# Patient Record
Sex: MALE | Race: WHITE | NOT HISPANIC OR LATINO | Employment: FULL TIME
[De-identification: names, ages, dates, MRNs, and addresses within clinical notes are randomized per-mention and may not be internally consistent; named-entity substitution may affect disease eponyms.]

---

## 2022-01-01 ENCOUNTER — HEALTH MAINTENANCE LETTER (OUTPATIENT)
Age: 27
End: 2022-01-01

## 2022-01-19 ENCOUNTER — DOCUMENTATION ONLY (OUTPATIENT)
Dept: FAMILY MEDICINE | Facility: CLINIC | Age: 27
End: 2022-01-19
Payer: COMMERCIAL

## 2022-01-19 NOTE — PROGRESS NOTES
Opening patient's chart to look over record since we were faxed [facility] records for establishing care. I confirmed that we could see these records in Care Everywhere so I will shred the paper copy.    Marlen Finn MD

## 2022-04-11 ENCOUNTER — E-VISIT (OUTPATIENT)
Dept: FAMILY MEDICINE | Facility: CLINIC | Age: 27
End: 2022-04-11
Payer: COMMERCIAL

## 2022-04-11 DIAGNOSIS — L20.89 OTHER ATOPIC DERMATITIS: Primary | ICD-10-CM

## 2022-04-11 PROCEDURE — 99421 OL DIG E/M SVC 5-10 MIN: CPT | Performed by: PHYSICIAN ASSISTANT

## 2022-04-11 RX ORDER — TRIAMCINOLONE ACETONIDE 1 MG/G
OINTMENT TOPICAL 2 TIMES DAILY
Qty: 80 G | Refills: 1 | Status: SHIPPED | OUTPATIENT
Start: 2022-04-11 | End: 2022-08-04

## 2022-08-04 ENCOUNTER — HOSPITAL ENCOUNTER (EMERGENCY)
Facility: CLINIC | Age: 27
Discharge: HOME | End: 2022-08-04
Attending: NURSE PRACTITIONER | Admitting: NURSE PRACTITIONER
Payer: COMMERCIAL

## 2022-08-04 VITALS
HEART RATE: 84 BPM | SYSTOLIC BLOOD PRESSURE: 137 MMHG | RESPIRATION RATE: 16 BRPM | OXYGEN SATURATION: 100 % | DIASTOLIC BLOOD PRESSURE: 76 MMHG

## 2022-08-04 DIAGNOSIS — F41.9 ANXIETY: ICD-10-CM

## 2022-08-04 PROCEDURE — G0463 HOSPITAL OUTPT CLINIC VISIT: HCPCS | Performed by: NURSE PRACTITIONER

## 2022-08-04 PROCEDURE — 99214 OFFICE O/P EST MOD 30 MIN: CPT | Performed by: NURSE PRACTITIONER

## 2022-08-04 RX ORDER — HYDROXYZINE HYDROCHLORIDE 25 MG/1
25-50 TABLET, FILM COATED ORAL 3 TIMES DAILY PRN
Qty: 30 TABLET | Refills: 0 | Status: SHIPPED | OUTPATIENT
Start: 2022-08-04 | End: 2022-11-07

## 2022-08-04 NOTE — ED PROVIDER NOTES
History     Chief Complaint   Patient presents with     Anxiety     History of anxiety, not currently taking any medication. Started seeing counselor this week for anxiety. No thoughts of harming self or others. Anxiety for whole life but becoming more prevalent. Has history with anxiety; establishing PCP at end of month due to move.     HPI  Ivan Mcguire is a 27 year old male who presents to urgent care with concerns of ongoing anxiety.  Patient reports chronic life history of anxiety.  Patient states he previously was ending [employer] and had been on Paxil.  Patient states due to negative side effects of Paxil including GI upset, depression he discontinued the Paxil.  Patient states he has not been seen for a few years in [place].  Patient states he is recently moved to [place].  Patient reports his employer has provided him with counseling at work that he just started this week.  Patient denies suicidal thoughts, suicidal plans, homicidal thoughts.  Patient has not tried any other therapies or treatments at this point in time for his anxiety.  Patient does have an appointment at the end of the month at the [facility] for assessment and treatment of his anxiety.    Allergies:  No Known Allergies    Problem List:    Patient Active Problem List    Diagnosis Date Noted     LIZZETH (generalized anxiety disorder) 03/31/2014     Priority: Medium     Seasonal allergic rhinitis 08/18/2008     Priority: Medium        Past Medical History:    Past Medical History:   Diagnosis Date     Allergic rhinitis due to other allergen      Moderate persistent asthma 4/18/2005       Past Surgical History:    No past surgical history on file.    Family History:    Family History   Problem Relation Age of Onset     Hypertension Father      Allergies Father      Alzheimer Disease Maternal Grandmother      Arthritis Maternal Grandfather      Arthritis Paternal Grandmother        Social History:  Marital Status:  Single [1]  Social  History     Tobacco Use     Smoking status: Never Smoker     Smokeless tobacco: Never Used   Substance Use Topics     Alcohol use: No     Drug use: No        Medications:    hydrOXYzine (ATARAX) 25 MG tablet      Review of Systems  As mentioned above in the history present illness. All other systems were reviewed and are negative.    Physical Exam   BP: 137/76  Pulse: 84  Resp: 16  SpO2: 100 %      Physical Exam  Vitals and nursing note reviewed.   Constitutional:       General: He is not in acute distress.     Appearance: He is well-developed. He is not ill-appearing, toxic-appearing or diaphoretic.   HENT:      Head: Normocephalic and atraumatic.      Right Ear: External ear normal.      Left Ear: External ear normal.      Nose: Nose normal.   Eyes:      General:         Right eye: No discharge.         Left eye: No discharge.      Conjunctiva/sclera: Conjunctivae normal.   Cardiovascular:      Rate and Rhythm: Normal rate and regular rhythm.      Heart sounds: Normal heart sounds. No murmur heard.    No friction rub. No gallop.   Pulmonary:      Effort: Pulmonary effort is normal.      Breath sounds: Normal breath sounds. No stridor. No wheezing.   Skin:     General: Skin is warm.      Findings: No rash.   Neurological:      Mental Status: He is alert and oriented to person, place, and time.   Psychiatric:         Attention and Perception: Attention and perception normal.         Mood and Affect: Mood is anxious.         Speech: Speech normal.         Behavior: Behavior normal. Behavior is cooperative.         Thought Content: Thought content normal.         Cognition and Memory: Cognition normal.         ED Course                 Procedures    No results found for this or any previous visit (from the past 24 hour(s)).    Medications - No data to display    Assessments & Plan (with Medical Decision Making)     I have reviewed the nursing notes.    I have reviewed the findings, diagnosis, plan and need for follow  up with the patient.  27-year-old male with lifelong history of anxiety presenting for evaluation of his anxiety.  Patient denies acute worsening.  Patient denying suicidal or homicidal thoughts.  He reports he is just moved into the area and seeking to initiate care but appointment is not till the end of the month.  Patient states he was last seen in [place] 2 to 3 years ago.  Exam findings reveal patient to be alert, orientated, anxious.  Patient appears earnest in seeking treatment.  Offered patient hydroxyzine and discussed importance of counseling and the additive benefit of counseling plus medication versus counseling alone versus medication alone.  Patient states he only has Fridays off.  We were able to obtain an appointment for patient tomorrow at the [facility].  Discussed this with patient.  Prescribed hydroxyzine.  Given instructions for hydroxyzine.  Patient discharged in stable condition.    Discharge Medication List as of 8/4/2022  6:50 PM      START taking these medications    Details   hydrOXYzine (ATARAX) 25 MG tablet Take 1-2 tablets (25-50 mg) by mouth 3 times daily as needed for itching or anxiety, Disp-30 tablet, R-0, E-Prescribe             Final diagnoses:   Anxiety       8/4/2022   [facility] EMERGENCY DEPT     Paul, Angie Odonnell, SAUNDRA CNP  08/04/22 5718

## 2022-09-16 ASSESSMENT — ENCOUNTER SYMPTOMS
CONSTIPATION: 0
MYALGIAS: 0
FREQUENCY: 0
PALPITATIONS: 1
FEVER: 0
HEMATOCHEZIA: 0
DIARRHEA: 0
PARESTHESIAS: 0
NAUSEA: 0
HEMATURIA: 0
HEARTBURN: 0
EYE PAIN: 0
SHORTNESS OF BREATH: 0
ARTHRALGIAS: 0
DYSURIA: 0
JOINT SWELLING: 0
NERVOUS/ANXIOUS: 1
HEADACHES: 0
COUGH: 0
DIZZINESS: 0
SORE THROAT: 0
WEAKNESS: 0
ABDOMINAL PAIN: 0
CHILLS: 0

## 2022-09-16 ASSESSMENT — PATIENT HEALTH QUESTIONNAIRE - PHQ9
10. IF YOU CHECKED OFF ANY PROBLEMS, HOW DIFFICULT HAVE THESE PROBLEMS MADE IT FOR YOU TO DO YOUR WORK, TAKE CARE OF THINGS AT HOME, OR GET ALONG WITH OTHER PEOPLE: VERY DIFFICULT
SUM OF ALL RESPONSES TO PHQ QUESTIONS 1-9: 12
SUM OF ALL RESPONSES TO PHQ QUESTIONS 1-9: 12

## 2022-09-23 ENCOUNTER — OFFICE VISIT (OUTPATIENT)
Dept: FAMILY MEDICINE | Facility: CLINIC | Age: 27
End: 2022-09-23
Payer: COMMERCIAL

## 2022-09-23 VITALS
HEIGHT: 68 IN | TEMPERATURE: 98.2 F | RESPIRATION RATE: 18 BRPM | HEART RATE: 70 BPM | DIASTOLIC BLOOD PRESSURE: 82 MMHG | SYSTOLIC BLOOD PRESSURE: 126 MMHG | WEIGHT: 211.2 LBS | BODY MASS INDEX: 32.01 KG/M2 | OXYGEN SATURATION: 98 %

## 2022-09-23 DIAGNOSIS — Z00.00 ROUTINE GENERAL MEDICAL EXAMINATION AT A HEALTH CARE FACILITY: Primary | ICD-10-CM

## 2022-09-23 DIAGNOSIS — Z13.220 SCREENING FOR HYPERLIPIDEMIA: ICD-10-CM

## 2022-09-23 DIAGNOSIS — Z11.59 NEED FOR HEPATITIS C SCREENING TEST: ICD-10-CM

## 2022-09-23 DIAGNOSIS — H61.23 BILATERAL IMPACTED CERUMEN: ICD-10-CM

## 2022-09-23 DIAGNOSIS — F33.1 MODERATE EPISODE OF RECURRENT MAJOR DEPRESSIVE DISORDER (H): ICD-10-CM

## 2022-09-23 DIAGNOSIS — F41.9 ANXIETY: ICD-10-CM

## 2022-09-23 DIAGNOSIS — Z11.4 SCREENING FOR HIV (HUMAN IMMUNODEFICIENCY VIRUS): ICD-10-CM

## 2022-09-23 LAB
ALBUMIN SERPL BCG-MCNC: 4.9 G/DL (ref 3.5–5.2)
ALP SERPL-CCNC: 82 U/L (ref 40–129)
ALT SERPL W P-5'-P-CCNC: 37 U/L (ref 10–50)
ANION GAP SERPL CALCULATED.3IONS-SCNC: 15 MMOL/L (ref 7–15)
AST SERPL W P-5'-P-CCNC: 31 U/L (ref 10–50)
BILIRUB SERPL-MCNC: 0.6 MG/DL
BUN SERPL-MCNC: 12.1 MG/DL (ref 6–20)
CALCIUM SERPL-MCNC: 9.3 MG/DL (ref 8.6–10)
CHLORIDE SERPL-SCNC: 100 MMOL/L (ref 98–107)
CHOLEST SERPL-MCNC: 227 MG/DL
CREAT SERPL-MCNC: 0.97 MG/DL (ref 0.67–1.17)
DEPRECATED HCO3 PLAS-SCNC: 26 MMOL/L (ref 22–29)
ERYTHROCYTE [DISTWIDTH] IN BLOOD BY AUTOMATED COUNT: 11.7 % (ref 10–15)
GFR SERPL CREATININE-BSD FRML MDRD: >90 ML/MIN/1.73M2
GLUCOSE SERPL-MCNC: 102 MG/DL (ref 70–99)
HCT VFR BLD AUTO: 47.4 % (ref 40–53)
HDLC SERPL-MCNC: 40 MG/DL
HGB BLD-MCNC: 15.9 G/DL (ref 13.3–17.7)
LDLC SERPL CALC-MCNC: 138 MG/DL
MCH RBC QN AUTO: 29.9 PG (ref 26.5–33)
MCHC RBC AUTO-ENTMCNC: 33.5 G/DL (ref 31.5–36.5)
MCV RBC AUTO: 89 FL (ref 78–100)
NONHDLC SERPL-MCNC: 187 MG/DL
PLATELET # BLD AUTO: 272 10E3/UL (ref 150–450)
POTASSIUM SERPL-SCNC: 4.2 MMOL/L (ref 3.4–5.3)
PROT SERPL-MCNC: 8.2 G/DL (ref 6.4–8.3)
RBC # BLD AUTO: 5.32 10E6/UL (ref 4.4–5.9)
SODIUM SERPL-SCNC: 141 MMOL/L (ref 136–145)
TRIGL SERPL-MCNC: 245 MG/DL
TSH SERPL DL<=0.005 MIU/L-ACNC: 1.7 UIU/ML (ref 0.3–4.2)
WBC # BLD AUTO: 5.4 10E3/UL (ref 4–11)

## 2022-09-23 PROCEDURE — 85027 COMPLETE CBC AUTOMATED: CPT | Performed by: NURSE PRACTITIONER

## 2022-09-23 PROCEDURE — 80053 COMPREHEN METABOLIC PANEL: CPT | Performed by: NURSE PRACTITIONER

## 2022-09-23 PROCEDURE — 80061 LIPID PANEL: CPT | Performed by: NURSE PRACTITIONER

## 2022-09-23 PROCEDURE — 82306 VITAMIN D 25 HYDROXY: CPT | Performed by: NURSE PRACTITIONER

## 2022-09-23 PROCEDURE — 99385 PREV VISIT NEW AGE 18-39: CPT | Mod: 25 | Performed by: NURSE PRACTITIONER

## 2022-09-23 PROCEDURE — 99214 OFFICE O/P EST MOD 30 MIN: CPT | Mod: 25 | Performed by: NURSE PRACTITIONER

## 2022-09-23 PROCEDURE — 87389 HIV-1 AG W/HIV-1&-2 AB AG IA: CPT | Performed by: NURSE PRACTITIONER

## 2022-09-23 PROCEDURE — 36415 COLL VENOUS BLD VENIPUNCTURE: CPT | Performed by: NURSE PRACTITIONER

## 2022-09-23 PROCEDURE — 84443 ASSAY THYROID STIM HORMONE: CPT | Performed by: NURSE PRACTITIONER

## 2022-09-23 PROCEDURE — 69210 REMOVE IMPACTED EAR WAX UNI: CPT | Performed by: NURSE PRACTITIONER

## 2022-09-23 PROCEDURE — 90686 IIV4 VACC NO PRSV 0.5 ML IM: CPT | Performed by: NURSE PRACTITIONER

## 2022-09-23 PROCEDURE — 90471 IMMUNIZATION ADMIN: CPT | Performed by: NURSE PRACTITIONER

## 2022-09-23 PROCEDURE — 86803 HEPATITIS C AB TEST: CPT | Performed by: NURSE PRACTITIONER

## 2022-09-23 RX ORDER — FLUOXETINE 10 MG/1
10 CAPSULE ORAL DAILY
Qty: 90 CAPSULE | Refills: 0 | Status: SHIPPED | OUTPATIENT
Start: 2022-09-23 | End: 2022-09-23

## 2022-09-23 RX ORDER — FLUOXETINE 10 MG/1
10 CAPSULE ORAL DAILY
Qty: 90 CAPSULE | Refills: 0 | Status: SHIPPED | OUTPATIENT
Start: 2022-09-23 | End: 2022-11-07

## 2022-09-23 ASSESSMENT — ENCOUNTER SYMPTOMS
PALPITATIONS: 1
CONSTIPATION: 0
PARESTHESIAS: 0
HEMATURIA: 0
EYE PAIN: 0
SORE THROAT: 0
DYSURIA: 0
DIARRHEA: 0
ABDOMINAL PAIN: 0
WEAKNESS: 0
NERVOUS/ANXIOUS: 1
HEARTBURN: 0
HEADACHES: 0
JOINT SWELLING: 0
SHORTNESS OF BREATH: 0
FEVER: 0
ARTHRALGIAS: 0
COUGH: 0
MYALGIAS: 0
NAUSEA: 0
FREQUENCY: 0
CHILLS: 0
HEMATOCHEZIA: 0
DIZZINESS: 0

## 2022-09-23 ASSESSMENT — ANXIETY QUESTIONNAIRES
7. FEELING AFRAID AS IF SOMETHING AWFUL MIGHT HAPPEN: NOT AT ALL
GAD7 TOTAL SCORE: 15
GAD7 TOTAL SCORE: 15
6. BECOMING EASILY ANNOYED OR IRRITABLE: NOT AT ALL
3. WORRYING TOO MUCH ABOUT DIFFERENT THINGS: NEARLY EVERY DAY
5. BEING SO RESTLESS THAT IT IS HARD TO SIT STILL: NEARLY EVERY DAY
1. FEELING NERVOUS, ANXIOUS, OR ON EDGE: NEARLY EVERY DAY
4. TROUBLE RELAXING: NEARLY EVERY DAY
2. NOT BEING ABLE TO STOP OR CONTROL WORRYING: NEARLY EVERY DAY

## 2022-09-23 ASSESSMENT — PAIN SCALES - GENERAL: PAINLEVEL: NO PAIN (0)

## 2022-09-23 NOTE — PROGRESS NOTES
"SUBJECTIVE:   CC: Ivan is an 27 year old who presents for preventative health visit.       Patient has been advised of split billing requirements and indicates understanding: Yes  Healthy Habits:     Getting at least 3 servings of Calcium per day:  NO    Bi-annual eye exam:  Yes    Dental care twice a year:  Yes    Sleep apnea or symptoms of sleep apnea:  Daytime drowsiness and Excessive snoring    Diet:  Regular (no restrictions)    Frequency of exercise:  6-7 days/week    Duration of exercise:  45-60 minutes    Taking medications regularly:  Yes    Medication side effects:  Not applicable    PHQ-2 Total Score: 3    Additional concerns today:  No        * Would like to start a daily medication for anxiety, feels it is time. He has been on paxil in the past but this caused poor side effects. His anxiety is present daily. It is impacting daily life. He feels like his mind is always racing. He does think about life from 5-6 years ago and does a \"coulda, woulda, shoulda\". He has cancelled and rescheduled appointments in the patients. He has utilized his EAP. He feels like this is going well.     Wondering if he should get his covid booster today, had covid at the end of August.      Will get flu shot today    Today's PHQ-2 Score:   PHQ-2 ( 1999 Pfizer) 9/16/2022   Q1: Little interest or pleasure in doing things 0   Q2: Feeling down, depressed or hopeless 3   PHQ-2 Score 3   Q1: Little interest or pleasure in doing things Not at all   Q2: Feeling down, depressed or hopeless Nearly every day   PHQ-2 Score 3     PHQ 8/19/2022 9/16/2022   PHQ-9 Total Score 13 12   Q9: Thoughts of better off dead/self-harm past 2 weeks Not at all Not at all         Abuse: Current or Past(Physical, Sexual or Emotional)- No  Do you feel safe in your environment? Yes    Have you ever done Advance Care Planning? (For example, a Health Directive, POLST, or a discussion with a medical provider or your loved ones about your wishes): No, advance " care planning information given to patient to review.  Patient declined advance care planning discussion at this time.    Social History     Tobacco Use     Smoking status: Never Smoker     Smokeless tobacco: Never Used   Substance Use Topics     Alcohol use: No         Alcohol Use 9/16/2022   Prescreen: >3 drinks/day or >7 drinks/week? No       Last PSA: No results found for: PSA    Reviewed orders with patient. Reviewed health maintenance and updated orders accordingly - Yes  Lab work is in process  Labs reviewed in EPIC  BP Readings from Last 3 Encounters:   09/23/22 126/82   08/04/22 137/76   07/03/14 137/72    Wt Readings from Last 3 Encounters:   09/23/22 95.8 kg (211 lb 3.2 oz)   07/03/14 77.1 kg (170 lb)   03/27/14 78.7 kg (173 lb 6.4 oz)                  Patient Active Problem List   Diagnosis     Seasonal allergic rhinitis     LIZZETH (generalized anxiety disorder)     History reviewed. No pertinent surgical history.    Social History     Tobacco Use     Smoking status: Never Smoker     Smokeless tobacco: Never Used   Substance Use Topics     Alcohol use: No     Family History   Problem Relation Age of Onset     Hypertension Father      Allergies Father      Alzheimer Disease Maternal Grandmother      Arthritis Maternal Grandfather      Arthritis Paternal Grandmother      Hypertension Mother          Current Outpatient Medications   Medication Sig Dispense Refill     FLUoxetine (PROZAC) 10 MG capsule Take 1 capsule (10 mg) by mouth daily 90 capsule 0     hydrOXYzine (ATARAX) 25 MG tablet Take 1-2 tablets (25-50 mg) by mouth 3 times daily as needed for itching or anxiety 30 tablet 0     No Known Allergies  No lab results found.     Reviewed and updated as needed this visit by clinical staff   Tobacco  Allergies  Meds  Problems  Med Hx  Surg Hx  Fam Hx  Soc   Hx          Reviewed and updated as needed this visit by Provider   Tobacco  Allergies  Meds  Problems  Med Hx  Surg Hx  Fam Hx          "  Past Medical History:   Diagnosis Date     Allergic rhinitis due to other allergen      Depressive disorder Not sure    Christel been depressed for quite a long time, and have been on medication in the past     Moderate persistent asthma 04/18/2005      History reviewed. No pertinent surgical history.    Review of Systems   Constitutional: Negative for chills and fever.   HENT: Negative for congestion, ear pain, hearing loss and sore throat.    Eyes: Negative for pain and visual disturbance.   Respiratory: Negative for cough and shortness of breath.    Cardiovascular: Positive for palpitations. Negative for chest pain and peripheral edema.   Gastrointestinal: Negative for abdominal pain, constipation, diarrhea, heartburn, hematochezia and nausea.   Genitourinary: Negative for dysuria, frequency, genital sores, hematuria, impotence, penile discharge and urgency.   Musculoskeletal: Negative for arthralgias, joint swelling and myalgias.   Skin: Negative for rash.   Neurological: Negative for dizziness, weakness, headaches and paresthesias.   Psychiatric/Behavioral: Negative for mood changes. The patient is nervous/anxious.          OBJECTIVE:   /82 (BP Location: Right arm, Patient Position: Chair, Cuff Size: Adult Large)   Pulse 70   Temp 98.2  F (36.8  C) (Tympanic)   Resp 18   Ht 1.727 m (5' 8\")   Wt 95.8 kg (211 lb 3.2 oz)   SpO2 98%   BMI 32.11 kg/m      Physical Exam  GENERAL: healthy, alert and no distress  EYES: Eyes grossly normal to inspection, PERRL and conjunctivae and sclerae normal  HENT: Bilateral TMs occluded with soft cerumen.  I personally utilized lighted speculum to remove cerumen bilaterally patient tolerated procedure well and was successful.  Ear canals and TM's normal, nose and mouth without ulcers or lesions  NECK: no adenopathy, no asymmetry, masses, or scars and thyroid normal to palpation  RESP: lungs clear to auscultation - no rales, rhonchi or wheezes  CV: regular rate and rhythm, " normal S1 S2, no S3 or S4, no murmur, click or rub, no peripheral edema and peripheral pulses strong  ABDOMEN: soft, nontender, no hepatosplenomegaly, no masses and bowel sounds normal  MS: no gross musculoskeletal defects noted, no edema  SKIN: no suspicious lesions or rashes  NEURO: Normal strength and tone, mentation intact and speech normal  PSYCH: mentation appears normal, affect normal/bright      ASSESSMENT/PLAN:   1. Routine general medical examination at a health care facility  Healthy Male exam completed today.  I discussed preventative measures with the patient including continuing with lifestyle modifications of a balanced diet and regular cardiovascular exercise.  I discussed self testicular exams and how to complete these.  I encouraged patient to follow-up yearly for annual physicals and sooner as needed.     2. Moderate episode of recurrent major depressive disorder (H)  Start fluoxetine discussed risks and benefits of medication as well as potential side effects.  Plan follow-up in approximately 4 weeks to determine dose adjustments if needed or changing medication.  Labs to be completed as below to evaluate for other potential causes of depression and anxiety.  - FLUoxetine (PROZAC) 10 MG capsule; Take 1 capsule (10 mg) by mouth daily  Dispense: 90 capsule; Refill: 0  - TSH with free T4 reflex; Future  - Vitamin D Deficiency; Future  - Comprehensive metabolic panel (BMP + Alb, Alk Phos, ALT, AST, Total. Bili, TP); Future  - CBC with platelets; Future    3. Anxiety  See above  - FLUoxetine (PROZAC) 10 MG capsule; Take 1 capsule (10 mg) by mouth daily  Dispense: 90 capsule; Refill: 0  - TSH with free T4 reflex; Future  - Vitamin D Deficiency; Future  - Comprehensive metabolic panel (BMP + Alb, Alk Phos, ALT, AST, Total. Bili, TP); Future  - CBC with platelets; Future    4. Screening for hyperlipidemia  - Lipid panel reflex to direct LDL Fasting; Future    5. Screening for HIV (human immunodeficiency  "virus)  - HIV Antigen Antibody Combo; Future    6. Need for hepatitis C screening test  - Hepatitis C Screen Reflex to HCV RNA Quant and Genotype; Future    7. Bilateral impacted cerumen  I personally reviewed the cerumen from bilateral ear canals utilizing lighted speculum.  Patient tolerated procedure well is without complaint.      Patient has been advised of split billing requirements and indicates understanding: Yes    COUNSELING:   Reviewed preventive health counseling, as reflected in patient instructions       Regular exercise       Healthy diet/nutrition    Estimated body mass index is 32.11 kg/m  as calculated from the following:    Height as of this encounter: 1.727 m (5' 8\").    Weight as of this encounter: 95.8 kg (211 lb 3.2 oz).     Weight management plan: Discussed healthy diet and exercise guidelines    He reports that he has never smoked. He has never used smokeless tobacco.      Counseling Resources:  ATP IV Guidelines  Pooled Cohorts Equation Calculator  FRAX Risk Assessment  ICSI Preventive Guidelines  Dietary Guidelines for Americans, 2010  USDA's MyPlate  ASA Prophylaxis  Lung CA Screening    Charisse Hernandez, SAUNDRA CNP  [facility]  "

## 2022-09-24 ENCOUNTER — MYC MEDICAL ADVICE (OUTPATIENT)
Dept: FAMILY MEDICINE | Facility: CLINIC | Age: 27
End: 2022-09-24

## 2022-09-26 LAB
DEPRECATED CALCIDIOL+CALCIFEROL SERPL-MC: 44 UG/L (ref 20–75)
HCV AB SERPL QL IA: NONREACTIVE
HIV 1+2 AB+HIV1 P24 AG SERPL QL IA: NONREACTIVE

## 2022-10-12 ENCOUNTER — MYC MEDICAL ADVICE (OUTPATIENT)
Dept: FAMILY MEDICINE | Facility: CLINIC | Age: 27
End: 2022-10-12

## 2022-11-03 ENCOUNTER — HOSPITAL ENCOUNTER (EMERGENCY)
Facility: CLINIC | Age: 27
Discharge: HOME | End: 2022-11-03
Attending: EMERGENCY MEDICINE | Admitting: EMERGENCY MEDICINE
Payer: COMMERCIAL

## 2022-11-03 ENCOUNTER — NURSE TRIAGE (OUTPATIENT)
Dept: NURSING | Facility: CLINIC | Age: 27
End: 2022-11-03

## 2022-11-03 VITALS
SYSTOLIC BLOOD PRESSURE: 122 MMHG | HEIGHT: 69 IN | DIASTOLIC BLOOD PRESSURE: 72 MMHG | OXYGEN SATURATION: 99 % | TEMPERATURE: 97.8 F | WEIGHT: 200 LBS | RESPIRATION RATE: 18 BRPM | BODY MASS INDEX: 29.62 KG/M2 | HEART RATE: 89 BPM

## 2022-11-03 DIAGNOSIS — R30.0 DYSURIA: ICD-10-CM

## 2022-11-03 DIAGNOSIS — R31.29 MICROSCOPIC HEMATURIA: ICD-10-CM

## 2022-11-03 LAB
ALBUMIN UR-MCNC: NEGATIVE MG/DL
APPEARANCE UR: CLEAR
BILIRUB UR QL STRIP: NEGATIVE
CAOX CRY #/AREA URNS HPF: ABNORMAL /HPF
COLOR UR AUTO: YELLOW
GLUCOSE UR STRIP-MCNC: NEGATIVE MG/DL
HGB UR QL STRIP: NEGATIVE
KETONES UR STRIP-MCNC: 40 MG/DL
LEUKOCYTE ESTERASE UR QL STRIP: NEGATIVE
MUCOUS THREADS #/AREA URNS LPF: PRESENT /LPF
NITRATE UR QL: NEGATIVE
PH UR STRIP: 6 [PH] (ref 5–7)
RBC URINE: 5 /HPF
SP GR UR STRIP: 1.02 (ref 1–1.03)
UROBILINOGEN UR STRIP-MCNC: NORMAL MG/DL
WBC URINE: 1 /HPF

## 2022-11-03 PROCEDURE — 87591 N.GONORRHOEAE DNA AMP PROB: CPT | Performed by: EMERGENCY MEDICINE

## 2022-11-03 PROCEDURE — 81001 URINALYSIS AUTO W/SCOPE: CPT | Performed by: EMERGENCY MEDICINE

## 2022-11-03 PROCEDURE — 99282 EMERGENCY DEPT VISIT SF MDM: CPT | Performed by: EMERGENCY MEDICINE

## 2022-11-03 PROCEDURE — 99283 EMERGENCY DEPT VISIT LOW MDM: CPT | Performed by: EMERGENCY MEDICINE

## 2022-11-03 PROCEDURE — 81001 URINALYSIS AUTO W/SCOPE: CPT | Performed by: FAMILY MEDICINE

## 2022-11-03 PROCEDURE — 87491 CHLMYD TRACH DNA AMP PROBE: CPT | Performed by: EMERGENCY MEDICINE

## 2022-11-03 ASSESSMENT — ENCOUNTER SYMPTOMS
HEMATURIA: 0
VOMITING: 0
WOUND: 0
COUGH: 0
FATIGUE: 0
NERVOUS/ANXIOUS: 1
BACK PAIN: 0
NAUSEA: 0
FLANK PAIN: 0
CHILLS: 0
SHORTNESS OF BREATH: 0
ABDOMINAL PAIN: 0
DIFFICULTY URINATING: 1
FEVER: 0
FREQUENCY: 0
LIGHT-HEADEDNESS: 0
DIARRHEA: 0
DYSURIA: 0
CHEST TIGHTNESS: 0
APPETITE CHANGE: 0

## 2022-11-03 ASSESSMENT — ACTIVITIES OF DAILY LIVING (ADL): ADLS_ACUITY_SCORE: 35

## 2022-11-03 NOTE — ED TRIAGE NOTES
Patient reports testicle and penis pain after having sexual intercourse with a new partner this weekend. Patient appears very anxious and wants to make sure there is no tissue damage or anything is injured. Denies any abnormal drainage but does state he feels like his penis looks more red than normal. Also reports he is unable to get an erection where as prior he has had no issues.     Triage Assessment     Row Name 11/03/22 2291       Triage Assessment (Adult)    Airway WDL WDL       Respiratory WDL    Respiratory WDL WDL       Skin Circulation/Temperature WDL    Skin Circulation/Temperature WDL WDL       Cardiac WDL    Cardiac WDL WDL       Peripheral/Neurovascular WDL    Peripheral Neurovascular WDL WDL       Cognitive/Neuro/Behavioral WDL    Cognitive/Neuro/Behavioral WDL WDL

## 2022-11-03 NOTE — TELEPHONE ENCOUNTER
"Pt had intercourse w/ a new partner 5 days ago. Since  then reports discomfort in penis and scrotum as well as redness and warmth. No pain, just discomfort, \"doesn't feel right\" and unable to maintain an erection. Denies fever, rash or sores, swelling, discharge from penis. Denies pain or burning w/ urination, hematuria, flank or back pain. Advised see provider w/i 3 days. Pt voiced understanding and agreement.   Offered clinic appt. Pt would like to be seen tonight so he intends to go Urgent Care.   Reason for Disposition    All other penis - scrotum symptoms  (Exception: painless rash < 24 hours duration)    Additional Information    Negative: Followed a genital area injury (e.g., penis, scrotum)    Negative: Pain or burning with passing urine is main symptom    Negative: Pain in scrotum or testicle is main symptom    Negative: Swollen scrotum OR lump in the scrotum/groin area    Negative: Pubic lice suspected    Negative: [1] Blood from end of penis AND [2] large amount    Negative: [1] Not circumcised AND [2] foreskin pulled back and stuck    Negative: [1] Looks infected (e.g., draining sore, ulcer, rash is painful to touch) AND [2] fever > 100.4 F (38.0 C)    Negative: [1] Unable to urinate (or only a few drops) > 4 hours AND [2] bladder feels very full (e.g., palpable bladder or strong urge to urinate)    Negative: [1] Prolonged unwanted erection (i.e., not related to sexual interest) AND [2] lasting > 4 hours    Negative: SEVERE pain (e.g., excruciating)    Negative: Patient sounds very sick or weak to the triager    Negative: [1] Pus or bloody discharge from the end of the penis AND [2] fever    Negative: [1] Pain or burning with passing urine AND [2] fever > 100.4 F (38.0 C)    Negative: [1] Pain or burning with passing urine AND [2] side (flank) or back pain present    Negative: Entire penis is swollen (i.e., edema)    Negative: [1] Antibiotic treatment > 3 days for STI (e.g., penile discharge from " gonorrhea, chlamydia) AND [2] painful urination not improved    Negative: Pus (white, yellow) or bloody discharge from end of penis    Negative: Pain or burning with passing urine    Negative: [1] Not circumcised AND [2] swollen foreskin    Negative: [1] Tiny water blisters rash AND [2] 3 or more    Negative: Looks infected (e.g., draining sore, ulcer, rash is painful to touch)    Negative: Blood in urine (red, pink, or tea-colored)    Negative: Severe itching    Negative: [1] Painless rash (e.g., redness, tiny bumps, sore) AND [2] present > 24 hours    Negative: Blood in semen    Negative: [1] Prolonged unwanted erection (i.e., not related to sexual interest) AND [2] lasting < 4 hours    Negative: Patient is worried they have a sexually transmitted infection (STI)    Protocols used: PENIS AND SCROTUM SYMPTOMS-A-AH

## 2022-11-04 ENCOUNTER — PATIENT OUTREACH (OUTPATIENT)
Dept: FAMILY MEDICINE | Facility: CLINIC | Age: 27
End: 2022-11-04

## 2022-11-04 ENCOUNTER — MYC MEDICAL ADVICE (OUTPATIENT)
Dept: FAMILY MEDICINE | Facility: CLINIC | Age: 27
End: 2022-11-04

## 2022-11-04 LAB
C TRACH DNA SPEC QL PROBE+SIG AMP: NEGATIVE
N GONORRHOEA DNA SPEC QL NAA+PROBE: NEGATIVE

## 2022-11-04 NOTE — ED PROVIDER NOTES
History     Chief Complaint   Patient presents with     Testicular Problem     Pt reports penis and testicular discomfort that started on Sunday, pt reports he did have a new sexual partner on Saturday and concerned he may have caught something. Pt denies discharge at this time, pt is afebrile in triage. Pt reports difficulty starting stream      HPI  Ivan Mcguire is a 27 year old male with no significant contributing past medical history presenting for evaluation of difficulty with urination and his erections over the past 5 days.  Patient reports he been feeling well and never had historical issues with urination.  This past Saturday night he had sexual intercourse with a new female partner.  He reports intercourse was somewhat more rough but denies any pain or injury during intercourse.  Patient denies any unusual symptoms or problems during intercourse either.  The next day he noticed some mild discomfort and difficulty initiating urination and had a sensation of incomplete voiding.  Denies hematuria or urethral discharge.  Symptoms have continued over the past 5 days.  Denies any known urethral discharge or rashes.  Patient is also noticed that he has not been able to obtain an erection since this episode 5 days ago.  He reports he normally easily gets an erection spontaneously and this has not occurred since intercourse 5 days ago.  Patient is very worried about this abrupt change.  He has talked with his partner about it and she is not aware of any sexual transmitted infections.  Patient very worried about this abrupt change so came in for evaluation.  Denies any associated testicular pain or swelling.  Patient denies any similar symptoms in the past.  No history of genitourinary infections or kidney stones.    Allergies:  No Known Allergies    Problem List:    Patient Active Problem List    Diagnosis Date Noted     LIZZETH (generalized anxiety disorder) 03/31/2014     Priority: Medium     Seasonal allergic  "rhinitis 08/18/2008     Priority: Medium        Past Medical History:    Past Medical History:   Diagnosis Date     Allergic rhinitis due to other allergen      Depressive disorder Not sure     Moderate persistent asthma 04/18/2005       Past Surgical History:    No past surgical history on file.    Family History:    Family History   Problem Relation Age of Onset     Hypertension Father      Allergies Father      Alzheimer Disease Maternal Grandmother      Arthritis Maternal Grandfather      Arthritis Paternal Grandmother      Hypertension Mother        Social History:  Marital Status:  Single [1]  Social History     Tobacco Use     Smoking status: Never     Smokeless tobacco: Never   Vaping Use     Vaping Use: Never used   Substance Use Topics     Alcohol use: No     Drug use: No        Medications:    FLUoxetine (PROZAC) 10 MG capsule  hydrOXYzine (ATARAX) 25 MG tablet          Review of Systems   Constitutional: Negative for appetite change, chills, fatigue and fever.   Respiratory: Negative for cough, chest tightness and shortness of breath.    Cardiovascular: Negative for chest pain.   Gastrointestinal: Negative for abdominal pain, diarrhea, nausea and vomiting.   Genitourinary: Positive for difficulty urinating and penile pain (Mild discomfort and difficulty initiating urination). Negative for decreased urine volume, dysuria, enuresis, flank pain, frequency, hematuria, penile swelling, scrotal swelling, testicular pain and urgency.   Musculoskeletal: Negative for back pain.   Skin: Negative for rash and wound.   Neurological: Negative for light-headedness.   Psychiatric/Behavioral: The patient is nervous/anxious (Anxious regarding new change in symptoms).    All other systems reviewed and are negative.      Physical Exam   BP: (!) 147/106  Pulse: 113  Temp: 97.8  F (36.6  C)  Resp: 18  Height: 172.7 cm (5' 8\")  Weight: 95.7 kg (211 lb)  SpO2: 100 %      Physical Exam  Vitals and nursing note reviewed. "   Constitutional:       Appearance: Normal appearance. He is not ill-appearing or diaphoretic.      Comments: Patient sitting upright in bed.  He is calm and cooperative and very collegial.  He will describe his history and story without difficulty.   HENT:      Head: Atraumatic.      Nose: Nose normal.   Eyes:      Conjunctiva/sclera: Conjunctivae normal.   Cardiovascular:      Pulses: Normal pulses.   Pulmonary:      Effort: Pulmonary effort is normal.   Abdominal:      Palpations: Abdomen is soft.      Tenderness: There is no abdominal tenderness.      Hernia: There is no hernia in the left inguinal area or right inguinal area.   Genitourinary:     Pubic Area: No rash.       Penis: Normal and circumcised. No phimosis, paraphimosis, erythema, tenderness, discharge, swelling or lesions.       Testes: Normal. Cremasteric reflex is present.         Right: Tenderness, swelling, testicular hydrocele or varicocele not present.         Left: Tenderness, swelling, testicular hydrocele or varicocele not present.      Epididymis:      Right: Normal. No tenderness.      Left: Normal. No tenderness.   Skin:     General: Skin is warm and dry.      Capillary Refill: Capillary refill takes less than 2 seconds.   Neurological:      Mental Status: He is alert and oriented to person, place, and time.   Psychiatric:         Mood and Affect: Mood normal.         ED Course                 Procedures                Results for orders placed or performed during the hospital encounter of 11/03/22 (from the past 24 hour(s))   UA with Microscopic reflex to Culture    Specimen: Urine, Midstream   Result Value Ref Range    Color Urine Yellow Colorless, Straw, Light Yellow, Yellow    Appearance Urine Clear Clear    Glucose Urine Negative Negative mg/dL    Bilirubin Urine Negative Negative    Ketones Urine 40 (A) Negative mg/dL    Specific Gravity Urine 1.020 1.003 - 1.035    Blood Urine Negative Negative    pH Urine 6.0 5.0 - 7.0    Protein  Albumin Urine Negative Negative mg/dL    Urobilinogen Urine Normal Normal, 2.0 mg/dL    Nitrite Urine Negative Negative    Leukocyte Esterase Urine Negative Negative    Mucus Urine Present (A) None Seen /LPF    Calcium Oxalate Crystals Urine Few (A) None Seen /HPF    RBC Urine 5 (H) <=2 /HPF    WBC Urine 1 <=5 /HPF    Narrative    Urine Culture not indicated       Medications - No data to display    Assessments & Plan (with Medical Decision Making)  Healthy-appearing 27-year-old presented for evaluation of change in urination habits as well as change in his ability to get an erection over the past 5 days since having intercourse with a new partner.  Physical exam is normal here without any evidence of trauma to the penis or evidence of infection.  Urinalysis shows no infectious abnormality but did show some red cells with some calcium oxalate crystals.  He has been not having any flank pain or other symptoms of obstructive kidney stone however may be passing some microscopic stones leading to some microscopic hematuria which may be the cause of his symptoms.  Chlamydia and gonorrhea testing also obtained and is pending.  Exact cause of his symptoms is unclear but does not appear dangerous as he continues to void and have no other systemic symptoms.  Advised that symptoms are likely to subside over the next few days to weeks but will have him follow-up with urology if symptoms do not resolve.  Counseled patient on return precautions if symptoms worsen or new symptoms develop.     I have reviewed the nursing notes.    I have reviewed the findings, diagnosis, plan and need for follow up with the patient.       New Prescriptions    No medications on file       Final diagnoses:   Dysuria   Microscopic hematuria       11/3/2022   [facility] EMERGENCY DEPT     Knott, Miko Mason MD  11/03/22 3423

## 2022-11-04 NOTE — TELEPHONE ENCOUNTER
"ED/Discharge Protocol    \"Hi, my name is Shaan Gomez RN, a registered nurse, and I am calling on behalf of Dr. Alejo's office at [facility].  I am calling to follow up and see how things are going for you after your recent visit.\"    \"I see that you were in the (ER/UC/IP) on 11/3/22.    How are you doing now that you are home?\" The urination is getting easier with less pain, patient is still anxious about whether or not this will be long lasting    Is patient experiencing symptoms that may require a hospital visit?  No    Discharge Instructions    \"Let's review your discharge instructions.  What is/are the follow-up recommendations?  Pt. Response: TO follow up with urology and famly practice    \"Were you instructed to make a follow-up appointment?\"  Pt. Response: Yes.  Has appointment been made?   Yes      \"When you see the provider, I would recommend that you bring your discharge instructions with you.    Medications    \"How many new medications are you on since your hospitalization/ED visit?\"    0-1  \"How many of your current medicines changed (dose, timing, name, etc.) while you were in the hospital/ED visit?\"   0-1  \"Do you have questions about your medications?\"   No  \"Were you newly diagnosed with heart failure, COPD, diabetes or did you have a heart attack?\"   No  For patients on insulin: \"Did you start on insulin in the hospital or did you have your insulin dose changed?\"   No  Post Discharge Medication Reconciliation Status: patient was not discharged from an inpatient facility or TCU.    Was MTM referral placed (*Make sure to put transitions as reason for referral)?   No    Call Summary    \"Do you have any questions or concerns about your condition or care plan at the moment?\"    No  Triage nurse advice given: Patient had already made all of his follow up appointments.     Patient was in ER 2 times in the past year (assess appropriateness of ER visits.)      \"If you have questions or things don't " "continue to improve, we encourage you contact us through the main clinic number,  [phone].  Even if the clinic is not open, triage nurses are available 24/7 to help you.     We would like you to know that our clinic has extended hours (provide information).  We also have urgent care (provide details on closest location and hours/contact info)\"      \"Thank you for your time and take care!\"        "

## 2022-11-04 NOTE — ED TRIAGE NOTES
Pt reports penis and testicular discomfort that started on Sunday, pt reports he did have a new sexual partner on Saturday and concerned he may have caught something. Pt denies discharge at this time, pt is afebrile in triage. Pt reports difficulty starting stream      Triage Assessment     Row Name 11/03/22 7938       Triage Assessment (Adult)    Airway WDL WDL       Respiratory WDL    Respiratory WDL WDL       Skin Circulation/Temperature WDL    Skin Circulation/Temperature WDL WDL       Cardiac WDL    Cardiac WDL WDL       Peripheral/Neurovascular WDL    Peripheral Neurovascular WDL WDL       Cognitive/Neuro/Behavioral WDL    Cognitive/Neuro/Behavioral WDL WDL

## 2022-11-04 NOTE — TELEPHONE ENCOUNTER
What type of discharge? Emergency Department  Risk of Hospital admission or ED visit: 39%  Is a TCM episode required? No  When should the patient follow up with PCP? 14 days of discharge.    Shaan Gomez RN  [facility]

## 2022-11-05 ENCOUNTER — NURSE TRIAGE (OUTPATIENT)
Dept: NURSING | Facility: CLINIC | Age: 27
End: 2022-11-05

## 2022-11-05 NOTE — TELEPHONE ENCOUNTER
Ivan ana, he was seen in ED on 11/3 and was concerned about an injury to his genitals. Since 10/30 patient has been dealing with some different symptoms. Patient was recommended to stay hydrated, monitor and follow up with urology if symptoms continue.    Patient calling as he forgot to mention, no pain during intercourse but there was pain with oral sex. Physical exam at the ED was benign    Current symptoms today, patient is having frequent urination, and pain at the end of urinating. Denies fever, abnormal discharge, blood in urine.  Protocol recommends see physician within 24 hours  Urgent care information and care advice given.  Patient will call back with worsening symptoms.  Bria uDkes RN   11/05/22 1:01 PM  [facility] Nurse Advisor          Reason for Disposition    Urinating more frequently than usual (i.e., frequency)    Additional Information    Negative: Shock suspected (e.g., cold/pale/clammy skin, too weak to stand, low BP, rapid pulse)    Negative: Sounds like a life-threatening emergency to the triager    Negative: Followed a female genital area injury (e.g., vagina, vulva)    Negative: Followed a male genital area injury (e.g., penis, scrotum)    Negative: Vaginal discharge    Negative: Pus (white, yellow) or bloody discharge from end of penis    Negative: [1] Taking antibiotic for urinary tract infection (UTI) AND [2] female    Negative: [1] Taking antibiotic for urinary tract infection (UTI) AND [2] male    Negative: [1] Pain or burning with passing urine (urination) AND [2] pregnant    Negative: [1] Pain or burning with passing urine (urination) AND [2] postpartum (from 0 to 6 weeks after delivery)    Negative: [1] Pain or burning with passing urine (urination) AND [2] female    Negative: [1] Pain or burning with passing urine (urination) AND [2] male    Negative: Pain or itching in the vulvar area    Negative: Pain in scrotum is main symptom    Negative: Blood in the urine is main  symptom    Negative: Symptoms arising from use of a urinary catheter (e.g., coude, Graham)    Negative: [1] Unable to urinate (or only a few drops) > 4 hours AND [2] bladder feels very full (e.g., palpable bladder or strong urge to urinate)    Negative: [1] Decreased urination and [2] drinking very little AND [2] dehydration suspected (e.g., dark urine, no urine > 12 hours, very dry mouth, very lightheaded)    Negative: Patient sounds very sick or weak to the triager    Negative: Fever > 100.4 F (38.0 C)    Negative: Side (flank) or lower back pain present    Negative: [1] Can't control passage of urine (i.e., urinary incontinence) AND [2] new-onset (< 2 weeks) or worsening    Protocols used: URINARY SYMPTOMS-A-AH

## 2022-11-06 ENCOUNTER — MYC MEDICAL ADVICE (OUTPATIENT)
Dept: FAMILY MEDICINE | Facility: CLINIC | Age: 27
End: 2022-11-06

## 2022-11-07 ENCOUNTER — MYC MEDICAL ADVICE (OUTPATIENT)
Dept: FAMILY MEDICINE | Facility: CLINIC | Age: 27
End: 2022-11-07

## 2022-11-07 ENCOUNTER — OFFICE VISIT (OUTPATIENT)
Dept: UROLOGY | Facility: CLINIC | Age: 27
End: 2022-11-07
Attending: FAMILY MEDICINE
Payer: COMMERCIAL

## 2022-11-07 ENCOUNTER — OFFICE VISIT (OUTPATIENT)
Dept: FAMILY MEDICINE | Facility: CLINIC | Age: 27
End: 2022-11-07
Payer: COMMERCIAL

## 2022-11-07 VITALS
DIASTOLIC BLOOD PRESSURE: 70 MMHG | HEIGHT: 69 IN | WEIGHT: 204 LBS | BODY MASS INDEX: 30.21 KG/M2 | OXYGEN SATURATION: 98 % | RESPIRATION RATE: 16 BRPM | SYSTOLIC BLOOD PRESSURE: 120 MMHG | TEMPERATURE: 98.1 F | HEART RATE: 93 BPM

## 2022-11-07 VITALS — DIASTOLIC BLOOD PRESSURE: 91 MMHG | HEART RATE: 99 BPM | SYSTOLIC BLOOD PRESSURE: 138 MMHG | OXYGEN SATURATION: 98 %

## 2022-11-07 DIAGNOSIS — N48.89 PENILE PAIN: Primary | ICD-10-CM

## 2022-11-07 DIAGNOSIS — R30.0 DYSURIA: Primary | ICD-10-CM

## 2022-11-07 DIAGNOSIS — N48.89 PENILE PAIN: ICD-10-CM

## 2022-11-07 LAB
ALBUMIN UR-MCNC: NEGATIVE MG/DL
APPEARANCE UR: CLEAR
BILIRUB UR QL STRIP: NEGATIVE
COLOR UR AUTO: YELLOW
GLUCOSE UR STRIP-MCNC: NEGATIVE MG/DL
HGB UR QL STRIP: NEGATIVE
KETONES UR STRIP-MCNC: NEGATIVE MG/DL
LEUKOCYTE ESTERASE UR QL STRIP: NEGATIVE
NITRATE UR QL: NEGATIVE
PH UR STRIP: 6 [PH] (ref 5–7)
SP GR UR STRIP: <=1.005 (ref 1–1.03)
UROBILINOGEN UR STRIP-ACNC: 0.2 E.U./DL

## 2022-11-07 PROCEDURE — 99203 OFFICE O/P NEW LOW 30 MIN: CPT | Mod: 25 | Performed by: STUDENT IN AN ORGANIZED HEALTH CARE EDUCATION/TRAINING PROGRAM

## 2022-11-07 PROCEDURE — 87109 MYCOPLASMA: CPT | Performed by: STUDENT IN AN ORGANIZED HEALTH CARE EDUCATION/TRAINING PROGRAM

## 2022-11-07 PROCEDURE — 81003 URINALYSIS AUTO W/O SCOPE: CPT | Performed by: FAMILY MEDICINE

## 2022-11-07 PROCEDURE — 51798 US URINE CAPACITY MEASURE: CPT | Performed by: STUDENT IN AN ORGANIZED HEALTH CARE EDUCATION/TRAINING PROGRAM

## 2022-11-07 PROCEDURE — 99213 OFFICE O/P EST LOW 20 MIN: CPT | Performed by: FAMILY MEDICINE

## 2022-11-07 RX ORDER — SULFAMETHOXAZOLE/TRIMETHOPRIM 800-160 MG
1 TABLET ORAL 2 TIMES DAILY
Qty: 10 TABLET | Refills: 0 | Status: SHIPPED | OUTPATIENT
Start: 2022-11-07 | End: 2022-11-12

## 2022-11-07 ASSESSMENT — PATIENT HEALTH QUESTIONNAIRE - PHQ9
SUM OF ALL RESPONSES TO PHQ QUESTIONS 1-9: 0
SUM OF ALL RESPONSES TO PHQ QUESTIONS 1-9: 0
10. IF YOU CHECKED OFF ANY PROBLEMS, HOW DIFFICULT HAVE THESE PROBLEMS MADE IT FOR YOU TO DO YOUR WORK, TAKE CARE OF THINGS AT HOME, OR GET ALONG WITH OTHER PEOPLE: NOT DIFFICULT AT ALL

## 2022-11-07 ASSESSMENT — PAIN SCALES - GENERAL
PAINLEVEL: MODERATE PAIN (4)
PAINLEVEL: MODERATE PAIN (4)

## 2022-11-07 NOTE — PROGRESS NOTES
Chief Complaint:   Penile pain and dysuria         History of Present Illness:   Ivan Mcguire is a 27 year old male with a history of generalized anxiety disorder who presents for evaluation of penile pain and dysuria.    Patient was having sexual intercourse with a new partner on 10/29 and had a sharp pain during oral sex.  He had no pain during intercourse.  Since then he has had nearly constant discomfort in the shaft of his penis.  He has also been unable to achieve or maintain an erection.  This has never been a problem before.  He denies any obvious changes to the penis that occurred after sexual activity including swelling and bruising.  He denies gross hematuria and penile discharge.    More recently, he has developed pain with urination and an increase in urinary frequency.  He is concerned about penile injury.    He has taken ibuprofen 600 mg twice daily for the last 4 to 5 days.  This does help when he takes it.           Past Medical History:     Past Medical History:   Diagnosis Date     Allergic rhinitis due to other allergen      Depressive disorder Not sure    Christel been depressed for quite a long time, and have been on medication in the past     Moderate persistent asthma 04/18/2005            Past Surgical History:   No past surgical history on file.         Medications     Current Outpatient Medications   Medication     FLUoxetine (PROZAC) 10 MG capsule     hydrOXYzine (ATARAX) 25 MG tablet     No current facility-administered medications for this visit.            Allergies:   Patient has no known allergies.         Review of Systems:  From intake questionnaire   Negative 14 system review except as noted on HPI, nurse's note.         Physical Exam:   Patient is a 27 year old  male   Vitals: Blood pressure (!) 138/91, pulse 99, SpO2 98 %.  General Appearance Adult: Alert, no acute distress, oriented.  Lungs: Non-labored breathing.  Heart: No obvious jugular venous distension present.  Neuro:  Alert, oriented, speech and mentation normal  : SIM anodular, symmetric, nontender to palpation.  Increased anal sphincter tone.    PVR: 77 mL      Labs and Pathology:    I personally reviewed all applicable laboratory data and went over findings with patient  Significant for:     BMP RESULTS:  Recent Labs   Lab Test 09/23/22  0932      POTASSIUM 4.2   CHLORIDE 100   CO2 26   ANIONGAP 15   *   BUN 12.1   CR 0.97   GFRESTIMATED >90   JOHNNY 9.3       UA RESULTS:   Recent Labs   Lab Test 11/07/22  0858 11/03/22  2140   SG <=1.005 1.020   URINEPH 6.0 6.0   NITRITE Negative Negative   RBCU  --  5*   WBCU  --  1            Assessment and Plan:     Assessment: 27 year old male seen in evaluation for penile pain and dysuria.  Patient reports his penile pain started about 2 weeks ago after sexual activity with a new partner.  He has not been able to achieve or maintain an erection since this encounter.  He now has nearly constant pain in his penis and new dysuria with increased urinary frequency.    We discussed that we can use penile Doppler ultrasound to monitor blood flow during erections.  However, this would need to be done at the [facility] and would require intracavernosal injection to produce an erection.  Patient was advised that it is not uncommon for patients to have temporary erectile dysfunction following an injury during intercourse.  I would hold off on any further assessment of his erectile dysfunction at this time.     We discussed possible causes of his penile pain with dysuria including urethritis, prostatitis, and pelvic floor dysfunction.  Prostate exam was performed today and was nontender with palpation.  There was increased anal sphincter tone.  We discussed that increased pelvic floor tightness, sometimes secondary to anxiety, can result in many urinary symptoms.     We discussed a short course of antibiotics may be useful to treat a presumed urethritis.  The patient is  interested in this.    Plan:  1.  Ureaplasma and mycoplasma testing today.  2. Bactrim twice daily x 5 days for urethritis.  Patient will let me know how he is doing after he completes this course.    RAYMOND MEJIA PA-C  Department of Urology

## 2022-11-07 NOTE — NURSING NOTE
Active order to obtain bladder scan? Yes   Name of ordering provider:  Polly Novoa  Bladder scan preformed post void Yes.  Bladder scan reveled 77ML  Provider notified?  Yes    Florina Best CMA

## 2022-11-07 NOTE — PROGRESS NOTES
Assessment & Plan     Penile pain  -- Gonorrhea and Chlamydia testing negative in the emergency department. His partner was tested prior to having intercourse. Exam in clinic today is unremarkable.  Believe his symptoms are likely related to oral intercourse which was painful for him during the act.  Will recheck urine studies today and refer to urology for further evaluation cares. Patient in agreement with the plan.   - Adult Urology  Referral  - UA Macro with Reflex to Micro and Culture - lab collect    The risks, benefits and treatment options of prescribed medications or other treatments have been discussed with the patient. The patient verbalized their understanding and should call or follow up if no improvement or if they develop further problems.        Yahir Rodriges DO  [facility] BETINA Love is a 27 year old, presenting for the following health issues:  ER F/U      HPI     ED/UC Followup:    Facility:  [facility]  Date of visit: 11/3/2022  Reason for visit: Penis pain  Current Status: not any better  UA obtained which showed calcium oxalate crystals and 5 RBC's.   Gonorrhea and Chlamydia negative.     Today in clinic    [place] with a new partner on 10/29.   No issues with intercourse.   Did have some discomfort during oral intercourse.     He reports his penis is now slightly more red.    Will have some discomfort in the penis. Which will come and go, but now is becoming more persistent.     No discharge from the penis.   No burning with urination.   Will have some irritation on the shaft of his penis.  No blood in the urine.   No longer able to get erections.   Has been using advil 600 mg twice daily for the discomfort and for the anti-inflammatory properties.       Review of Systems   Constitutional, HEENT, cardiovascular, pulmonary, gi and gu systems are negative, except as otherwise noted.      Objective    /70 (BP Location: Left arm,  "Patient Position: Chair, Cuff Size: Adult Large)   Pulse 93   Temp 98.1  F (36.7  C) (Tympanic)   Resp 16   Ht 1.753 m (5' 9\")   Wt 92.5 kg (204 lb)   SpO2 98%   BMI 30.13 kg/m    Body mass index is 30.13 kg/m .  Physical Exam   General: alert, cooperative, no acute distress   CV: RRR, no murmur  Resp: non-labored breathing, clear to auscultation, no wheezing or rales   Abdomen: Soft, non-tender, no guarding.   : circumcised penis, testes descended bilaterally. Non-tender to palpation. No specific skin changes or areas of erythema. No penile discharge. No open sores or lesions.   Extremities: No peripheral edema, calves non-tender.       Answers for HPI/ROS submitted by the patient on 11/7/2022  If you checked off any problems, how difficult have these problems made it for you to do your work, take care of things at home, or get along with other people?: Not difficult at all  PHQ9 TOTAL SCORE: 0      "

## 2022-11-07 NOTE — NURSING NOTE
"Initial BP (!) 138/91 (BP Location: Left arm, Patient Position: Chair, Cuff Size: Adult Large)   Pulse 99   SpO2 98%  Estimated body mass index is 30.13 kg/m  as calculated from the following:    Height as of an earlier encounter on 11/7/22: 1.753 m (5' 9\").    Weight as of an earlier encounter on 11/7/22: 92.5 kg (204 lb). .    Florina Best MA on 11/7/2022 at 2:51 PM    "

## 2022-11-14 LAB — BACTERIA UR CULT: NORMAL

## 2022-12-09 ENCOUNTER — MYC MEDICAL ADVICE (OUTPATIENT)
Dept: FAMILY MEDICINE | Facility: CLINIC | Age: 27
End: 2022-12-09

## 2023-03-06 ASSESSMENT — ANXIETY QUESTIONNAIRES
IF YOU CHECKED OFF ANY PROBLEMS ON THIS QUESTIONNAIRE, HOW DIFFICULT HAVE THESE PROBLEMS MADE IT FOR YOU TO DO YOUR WORK, TAKE CARE OF THINGS AT HOME, OR GET ALONG WITH OTHER PEOPLE: SOMEWHAT DIFFICULT
5. BEING SO RESTLESS THAT IT IS HARD TO SIT STILL: NOT AT ALL
GAD7 TOTAL SCORE: 4
7. FEELING AFRAID AS IF SOMETHING AWFUL MIGHT HAPPEN: NOT AT ALL
1. FEELING NERVOUS, ANXIOUS, OR ON EDGE: SEVERAL DAYS
4. TROUBLE RELAXING: SEVERAL DAYS
2. NOT BEING ABLE TO STOP OR CONTROL WORRYING: SEVERAL DAYS
3. WORRYING TOO MUCH ABOUT DIFFERENT THINGS: SEVERAL DAYS
6. BECOMING EASILY ANNOYED OR IRRITABLE: NOT AT ALL

## 2023-03-06 ASSESSMENT — PATIENT HEALTH QUESTIONNAIRE - PHQ9: SUM OF ALL RESPONSES TO PHQ QUESTIONS 1-9: 4

## 2023-03-07 ASSESSMENT — ANXIETY QUESTIONNAIRES
GAD7 TOTAL SCORE: 4
1. FEELING NERVOUS, ANXIOUS, OR ON EDGE: SEVERAL DAYS
6. BECOMING EASILY ANNOYED OR IRRITABLE: NOT AT ALL
GAD7 TOTAL SCORE: 4
7. FEELING AFRAID AS IF SOMETHING AWFUL MIGHT HAPPEN: NOT AT ALL
2. NOT BEING ABLE TO STOP OR CONTROL WORRYING: SEVERAL DAYS
3. WORRYING TOO MUCH ABOUT DIFFERENT THINGS: SEVERAL DAYS
5. BEING SO RESTLESS THAT IT IS HARD TO SIT STILL: NOT AT ALL
7. FEELING AFRAID AS IF SOMETHING AWFUL MIGHT HAPPEN: NOT AT ALL
IF YOU CHECKED OFF ANY PROBLEMS ON THIS QUESTIONNAIRE, HOW DIFFICULT HAVE THESE PROBLEMS MADE IT FOR YOU TO DO YOUR WORK, TAKE CARE OF THINGS AT HOME, OR GET ALONG WITH OTHER PEOPLE: SOMEWHAT DIFFICULT
4. TROUBLE RELAXING: SEVERAL DAYS
8. IF YOU CHECKED OFF ANY PROBLEMS, HOW DIFFICULT HAVE THESE MADE IT FOR YOU TO DO YOUR WORK, TAKE CARE OF THINGS AT HOME, OR GET ALONG WITH OTHER PEOPLE?: SOMEWHAT DIFFICULT
GAD7 TOTAL SCORE: 4

## 2023-03-07 ASSESSMENT — PATIENT HEALTH QUESTIONNAIRE - PHQ9
SUM OF ALL RESPONSES TO PHQ QUESTIONS 1-9: 4
SUM OF ALL RESPONSES TO PHQ QUESTIONS 1-9: 4
10. IF YOU CHECKED OFF ANY PROBLEMS, HOW DIFFICULT HAVE THESE PROBLEMS MADE IT FOR YOU TO DO YOUR WORK, TAKE CARE OF THINGS AT HOME, OR GET ALONG WITH OTHER PEOPLE: SOMEWHAT DIFFICULT

## 2023-03-13 ENCOUNTER — VIRTUAL VISIT (OUTPATIENT)
Dept: FAMILY MEDICINE | Facility: CLINIC | Age: 28
End: 2023-03-13
Payer: COMMERCIAL

## 2023-03-13 DIAGNOSIS — F41.9 ANXIETY: ICD-10-CM

## 2023-03-13 DIAGNOSIS — F33.1 MODERATE EPISODE OF RECURRENT MAJOR DEPRESSIVE DISORDER (H): Primary | ICD-10-CM

## 2023-03-13 DIAGNOSIS — F52.4 PREMATURE EJACULATION: ICD-10-CM

## 2023-03-13 PROCEDURE — 99214 OFFICE O/P EST MOD 30 MIN: CPT | Mod: VID | Performed by: NURSE PRACTITIONER

## 2023-03-13 RX ORDER — FLUOXETINE 10 MG/1
10 CAPSULE ORAL DAILY
COMMUNITY
End: 2023-06-07

## 2023-03-13 RX ORDER — PAROXETINE 10 MG/1
10 TABLET, FILM COATED ORAL EVERY MORNING
Qty: 90 TABLET | Refills: 0 | Status: SHIPPED | OUTPATIENT
Start: 2023-03-13 | End: 2023-04-18

## 2023-03-13 ASSESSMENT — ANXIETY QUESTIONNAIRES
GAD7 TOTAL SCORE: 4
IF YOU CHECKED OFF ANY PROBLEMS ON THIS QUESTIONNAIRE, HOW DIFFICULT HAVE THESE PROBLEMS MADE IT FOR YOU TO DO YOUR WORK, TAKE CARE OF THINGS AT HOME, OR GET ALONG WITH OTHER PEOPLE: SOMEWHAT DIFFICULT
2. NOT BEING ABLE TO STOP OR CONTROL WORRYING: MORE THAN HALF THE DAYS
6. BECOMING EASILY ANNOYED OR IRRITABLE: NOT AT ALL
3. WORRYING TOO MUCH ABOUT DIFFERENT THINGS: MORE THAN HALF THE DAYS
GAD7 TOTAL SCORE: 8
7. FEELING AFRAID AS IF SOMETHING AWFUL MIGHT HAPPEN: NOT AT ALL
5. BEING SO RESTLESS THAT IT IS HARD TO SIT STILL: SEVERAL DAYS
1. FEELING NERVOUS, ANXIOUS, OR ON EDGE: MORE THAN HALF THE DAYS

## 2023-03-13 ASSESSMENT — PATIENT HEALTH QUESTIONNAIRE - PHQ9
5. POOR APPETITE OR OVEREATING: SEVERAL DAYS
SUM OF ALL RESPONSES TO PHQ QUESTIONS 1-9: 4
10. IF YOU CHECKED OFF ANY PROBLEMS, HOW DIFFICULT HAVE THESE PROBLEMS MADE IT FOR YOU TO DO YOUR WORK, TAKE CARE OF THINGS AT HOME, OR GET ALONG WITH OTHER PEOPLE: SOMEWHAT DIFFICULT

## 2023-03-13 ASSESSMENT — ENCOUNTER SYMPTOMS: NERVOUS/ANXIOUS: 1

## 2023-03-13 NOTE — PROGRESS NOTES
Ivan is a 28 year old who is being evaluated via a billable video visit.      How would you like to obtain your AVS? MyChart  If the video visit is dropped, the invitation should be resent by: Text to cell phone: [phone]  Will anyone else be joining your video visit? No          Assessment & Plan     Moderate episode of recurrent major depressive disorder (H)  Anxiety  Premature ejaculation  He has not noticed any improvement in his mood since starting the fluoxetine. As he has also noted premature ejaculation, will change selective serotonin reuptake inhibitor from fluoxetine to paroxetine to treat mood as well as premature ejaculation. Reviewed risks vs benefits. Will start at 10 mg daily. Advised that if he does not notice any improvement to mood within two weeks, he should contact the clinic and the dose can be increased. Plan to reassess in approximately one month.  - PARoxetine (PAXIL) 10 MG tablet  Dispense: 90 tablet; Refill: 0    Return in about 4 weeks (around 4/10/2023) for Follow up.    SAUNDRA Aguirre [facility]   Ivan is a 28 year old accompanied by his self, presenting for the following health issues:  Anxiety, Recheck Medication (prozac), and Health Maintenance (Advised patient of .)      Anxiety    History of Present Illness       Mental Health Follow-up:  Patient presents to follow-up on Depression & Anxiety.Patient's depression since last visit has been:  No change  The patient is not having other symptoms associated with depression.  Patient's anxiety since last visit has been:  Medium  The patient is not having other symptoms associated with anxiety.  Any significant life events: No  Patient is not feeling anxious or having panic attacks.  Patient has no concerns about alcohol or drug use.    He eats 2-3 servings of fruits and vegetables daily.He consumes 0 sweetened beverage(s) daily.He exercises with enough effort to increase his  heart rate 30 to 60 minutes per day.  He exercises with enough effort to increase his heart rate 7 days per week.   He is taking medications regularly.    Today's PHQ-9         PHQ-9 Total Score: 4    PHQ-9 Q9 Thoughts of better off dead/self-harm past 2 weeks :   Not at all    How difficult have these problems made it for you to do your work, take care of things at home, or get along with other people: Somewhat difficult  Today's LIZZETH-7 Score: 4     Anxiety Follow-Up    How are you doing with your anxiety since your last visit? No change    Are you having other symptoms that might be associated with anxiety? No    Have you had a significant life event? No     Are you feeling depressed? Yes:  hopeless, and a lot of worrying about things    Do you have any concerns with your use of alcohol or other drugs? No    Social History     Tobacco Use     Smoking status: Never     Smokeless tobacco: Never   Vaping Use     Vaping Use: Never used   Substance Use Topics     Alcohol use: No     Drug use: No     LIZZETH-7 SCORE 3/6/2023 3/7/2023 3/13/2023   Total Score - - -   Total Score 4 (minimal anxiety) 4 (minimal anxiety) -   Total Score 4 4 8     PHQ 3/6/2023 3/7/2023 3/13/2023   PHQ-9 Total Score 4 4 4   Q9: Thoughts of better off dead/self-harm past 2 weeks Not at all Not at all Not at all     Last PHQ-9 3/13/2023   1.  Little interest or pleasure in doing things 0   2.  Feeling down, depressed, or hopeless 2   3.  Trouble falling or staying asleep, or sleeping too much 1   4.  Feeling tired or having little energy 0   5.  Poor appetite or overeating 0   6.  Feeling bad about yourself 1   7.  Trouble concentrating 0   8.  Moving slowly or restless 0   Q9: Thoughts of better off dead/self-harm past 2 weeks 0   PHQ-9 Total Score 4   Difficulty at work, home, or with people Not difficult at all     LIZZETH-7  3/13/2023   1. Feeling nervous, anxious, or on edge 2   2. Not being able to stop or control worrying 2   3. Worrying too much  about different things 2   4. Trouble relaxing 1   5. Being so restless that it is hard to sit still 1   6. Becoming easily annoyed or irritable 0   7. Feeling afraid, as if something awful might happen 0   LIZZETH-7 Total Score 8   If you checked any problems, how difficult have they made it for you to do your work, take care of things at home, or get along with other people? Somewhat difficult       Medication Followup of Prozac    Taking Medication as prescribed: yes, 10mg once daily with breakfast    Side Effects:  Once in a while, gets an irritable stomach and urge to use the bathroom, once or twice a couple of weeks    Medication Helping Symptoms:  Unsure, would like to discuss dosage change    Patient is concerned about ED and would like to discuss treatment options.    Ivan presents virtually to the clinic for follow-up on anxiety and depression, as well as concerns for premature ejaculation.    He reports that his mood has been stable since starting fluoxetine 10 mg daily about three months ago. He notes that he has not noticed improvement in his mood, though also has not noticed worsening of symptoms. He describes symptoms of sometimes feeling hopeless, often worrying about things. He is interested in adjusting his medication.    He also notes premature ejaculation, states he is not able to last sexually as long as he would like. He reports this has been ongoing for years. He is able to achieve an erection and reach orgasm. He notes that he might last longer with masturbation than with a partner, wonders if etiology could by psychological. He denies any past genital trauma or surgery, curvature with erection, bowel or bladder incontinence or change in habits, lightheadedness, chest pain, or cardiovascular history. He notes he has tried OTC supplements though did not notice any improvement. He denies drug use, smoking, and alcohol use.       Review of Systems   Psychiatric/Behavioral: The patient is  nervous/anxious.       Constitutional, HEENT, cardiovascular, pulmonary, gi and gu systems are negative, except as otherwise noted.      Objective         Vitals:  No vitals were obtained today due to virtual visit.    Physical Exam   GENERAL: Healthy, alert and no distress  EYES: Eyes grossly normal to inspection.  No discharge or erythema, or obvious scleral/conjunctival abnormalities.  RESP: No audible wheeze, cough, or visible cyanosis.  No visible retractions or increased work of breathing.    SKIN: Visible skin clear. No significant rash, abnormal pigmentation or lesions.  NEURO: Cranial nerves grossly intact.  Mentation and speech appropriate for age.  PSYCH: Mentation appears normal, affect normal/bright, judgement and insight intact, normal speech and appearance well-groomed.         Video-Visit Details    Type of service:  Video Visit     Originating Location (pt. Location): Home  Distant Location (provider location):  On-site  Platform used for Video Visit: AlvertoWell

## 2023-03-21 ENCOUNTER — MYC MEDICAL ADVICE (OUTPATIENT)
Dept: FAMILY MEDICINE | Facility: CLINIC | Age: 28
End: 2023-03-21
Payer: COMMERCIAL

## 2023-04-18 ENCOUNTER — MYC REFILL (OUTPATIENT)
Dept: FAMILY MEDICINE | Facility: CLINIC | Age: 28
End: 2023-04-18
Payer: COMMERCIAL

## 2023-04-18 DIAGNOSIS — F41.9 ANXIETY: ICD-10-CM

## 2023-04-18 DIAGNOSIS — F52.4 PREMATURE EJACULATION: ICD-10-CM

## 2023-04-18 DIAGNOSIS — F33.1 MODERATE EPISODE OF RECURRENT MAJOR DEPRESSIVE DISORDER (H): ICD-10-CM

## 2023-04-18 NOTE — TELEPHONE ENCOUNTER
Per 3/21/23 encounter patient and provider discussed Paxil increase from 10 mg to 20 mg. Routing to provider to update the Paxil dosage in the chart and to review medication refill request.     Please see Pufetto message.     Becky Thomas RN on 4/18/2023 at 5:09 PM

## 2023-04-19 RX ORDER — PAROXETINE 10 MG/1
20 TABLET, FILM COATED ORAL EVERY MORNING
Qty: 180 TABLET | Refills: 0 | Status: SHIPPED | OUTPATIENT
Start: 2023-04-19 | End: 2023-06-29

## 2023-06-01 NOTE — TELEPHONE ENCOUNTER
[place] is visible and remains on the outskirts of the milieu and watches what is happening with the unit   He brightens on approach He is calm and pleasant and cooperative and takes the prescribed medication except the Trazadone at HS medication pass  At bedtime he requested and received Tums for reported upset stomach, Artificial tears and Jeanmarie Feng Muscle Rub to Lower, Mid Back and Neck See my chart message    Zahira Fontenot RN on 11/7/2022 at 11:35 AM

## 2023-06-06 ENCOUNTER — OFFICE VISIT (OUTPATIENT)
Dept: PEDIATRICS | Facility: CLINIC | Age: 28
End: 2023-06-06
Payer: COMMERCIAL

## 2023-06-06 VITALS
WEIGHT: 203.9 LBS | BODY MASS INDEX: 30.9 KG/M2 | HEIGHT: 68 IN | HEART RATE: 73 BPM | TEMPERATURE: 98 F | OXYGEN SATURATION: 97 % | RESPIRATION RATE: 12 BRPM | SYSTOLIC BLOOD PRESSURE: 110 MMHG | DIASTOLIC BLOOD PRESSURE: 74 MMHG

## 2023-06-06 DIAGNOSIS — H00.022 HORDEOLUM INTERNUM OF RIGHT LOWER EYELID: Primary | ICD-10-CM

## 2023-06-06 PROBLEM — F41.9 ANXIETY: Status: ACTIVE | Noted: 2021-07-06

## 2023-06-06 PROBLEM — F32.1 CURRENT MODERATE EPISODE OF MAJOR DEPRESSIVE DISORDER (H): Status: ACTIVE | Noted: 2021-07-06

## 2023-06-06 PROCEDURE — 99213 OFFICE O/P EST LOW 20 MIN: CPT | Performed by: PHYSICIAN ASSISTANT

## 2023-06-06 RX ORDER — ERYTHROMYCIN 5 MG/G
0.5 OINTMENT OPHTHALMIC 3 TIMES DAILY
Qty: 7.5 G | Refills: 0 | Status: SHIPPED | OUTPATIENT
Start: 2023-06-06 | End: 2023-06-11

## 2023-06-06 ASSESSMENT — PAIN SCALES - GENERAL: PAINLEVEL: SEVERE PAIN (6)

## 2023-06-06 NOTE — PROGRESS NOTES
"  Assessment & Plan      Diagnosis Comments   1. Hordeolum internum of right lower eyelid  erythromycin (ROMYCIN) 5 MG/GM ophthalmic ointment   Warm compresses follow up two days, sooner if worsening.  See PI  The patient indicates understanding of these issues and agrees with the plan.                   BMI:   Estimated body mass index is 30.78 kg/m  as calculated from the following:    Height as of this encounter: 1.734 m (5' 8.25\").    Weight as of this encounter: 92.5 kg (203 lb 14.4 oz).           ELAINE [facility] DORI Love is a 28 year old, presenting for the following health issues:  Eye Problem        6/6/2023     2:27 PM   Additional Questions   Roomed by Verónica   Accompanied by Self         6/6/2023     2:27 PM   Patient Reported Additional Medications   Patient reports taking the following new medications no     History of Present Illness       Reason for visit:  My right eyelid is swollen and red and it hurts when i blink  Symptom onset:  1-3 days ago  Symptoms include:  Swelling, redness, pain when i blink and to the touch  Symptom intensity:  Moderate  Symptom progression:  Worsening  Had these symptoms before:  No  What makes it worse:  No  What makes it better:  No    He eats 2-3 servings of fruits and vegetables daily.He consumes 0 sweetened beverage(s) daily.He exercises with enough effort to increase his heart rate 30 to 60 minutes per day.  He exercises with enough effort to increase his heart rate 7 days per week.   He is taking medications regularly.         1. Right eye lower lid swollen since yesterday. No injury. No trauma.   Does not wear contacts.  Mild clear drainage this morning. Some crusting  Tried warm compress.  No vision changes. No eye pain. Soreness to eye lid.  Denies fevers, chills.             Review of Systems         Objective    /74 (BP Location: Right arm, Patient Position: Chair, Cuff Size: Adult Regular)   Pulse 73   " "Temp 98  F (36.7  C) (Tympanic)   Resp 12   Ht 1.734 m (5' 8.25\")   Wt 92.5 kg (203 lb 14.4 oz)   SpO2 97%   BMI 30.78 kg/m    Body mass index is 30.78 kg/m .  Physical Exam   GENERAL: healthy, alert and no distress  EYES: PERRL, visual fields normal, conjunctivae and sclerae normal and eyelids- hordeolum/sty right eye, lower lid. Swelling noted and redness. Mild surrounding erythema. Ttp.   RESP: lungs clear to auscultation - no rales, rhonchi or wheezes  CV: regular rate and rhythm, normal S1 S2, no S3 or S4, no murmur, click or rub, no peripheral edema and peripheral pulses strong                    "

## 2023-06-23 ASSESSMENT — ANXIETY QUESTIONNAIRES
5. BEING SO RESTLESS THAT IT IS HARD TO SIT STILL: NOT AT ALL
6. BECOMING EASILY ANNOYED OR IRRITABLE: NOT AT ALL
7. FEELING AFRAID AS IF SOMETHING AWFUL MIGHT HAPPEN: NOT AT ALL
8. IF YOU CHECKED OFF ANY PROBLEMS, HOW DIFFICULT HAVE THESE MADE IT FOR YOU TO DO YOUR WORK, TAKE CARE OF THINGS AT HOME, OR GET ALONG WITH OTHER PEOPLE?: NOT DIFFICULT AT ALL
IF YOU CHECKED OFF ANY PROBLEMS ON THIS QUESTIONNAIRE, HOW DIFFICULT HAVE THESE PROBLEMS MADE IT FOR YOU TO DO YOUR WORK, TAKE CARE OF THINGS AT HOME, OR GET ALONG WITH OTHER PEOPLE: NOT DIFFICULT AT ALL
2. NOT BEING ABLE TO STOP OR CONTROL WORRYING: NOT AT ALL
1. FEELING NERVOUS, ANXIOUS, OR ON EDGE: NOT AT ALL
GAD7 TOTAL SCORE: 2
GAD7 TOTAL SCORE: 2
7. FEELING AFRAID AS IF SOMETHING AWFUL MIGHT HAPPEN: NOT AT ALL
3. WORRYING TOO MUCH ABOUT DIFFERENT THINGS: SEVERAL DAYS
4. TROUBLE RELAXING: SEVERAL DAYS

## 2023-06-29 ENCOUNTER — VIRTUAL VISIT (OUTPATIENT)
Dept: FAMILY MEDICINE | Facility: CLINIC | Age: 28
End: 2023-06-29
Payer: COMMERCIAL

## 2023-06-29 DIAGNOSIS — F41.9 ANXIETY: ICD-10-CM

## 2023-06-29 DIAGNOSIS — F52.4 PREMATURE EJACULATION: ICD-10-CM

## 2023-06-29 DIAGNOSIS — F33.42 RECURRENT MAJOR DEPRESSIVE DISORDER, IN FULL REMISSION (H): ICD-10-CM

## 2023-06-29 PROCEDURE — 96127 BRIEF EMOTIONAL/BEHAV ASSMT: CPT | Performed by: NURSE PRACTITIONER

## 2023-06-29 PROCEDURE — 99214 OFFICE O/P EST MOD 30 MIN: CPT | Mod: 93 | Performed by: NURSE PRACTITIONER

## 2023-06-29 RX ORDER — PAROXETINE 10 MG/1
20 TABLET, FILM COATED ORAL EVERY MORNING
Qty: 180 TABLET | Refills: 3 | Status: SHIPPED | OUTPATIENT
Start: 2023-06-29 | End: 2023-09-08

## 2023-06-29 ASSESSMENT — PATIENT HEALTH QUESTIONNAIRE - PHQ9: SUM OF ALL RESPONSES TO PHQ QUESTIONS 1-9: 2

## 2023-06-29 NOTE — PROGRESS NOTES
"  Ivan is a 28 year old who is being evaluated via a billable telephone visit.      What phone number would you like to be contacted at? [phone]   How would you like to obtain your AVS? Bassemhart    Distant Location (provider location):  On-site    Assessment & Plan       ICD-10-CM    1. Recurrent major depressive disorder, in full remission (H)  F33.42 PARoxetine (PAXIL) 10 MG tablet      2. Anxiety  F41.9 PARoxetine (PAXIL) 10 MG tablet      3. Premature ejaculation  F52.4 PARoxetine (PAXIL) 10 MG tablet        Improvement in symptoms overall. Mental health is stable and in remission. Premature ejaculation symptoms have improved. Continue on medication as prescribed and follow up yearly or sooner as needed.              BMI:   Estimated body mass index is 30.78 kg/m  as calculated from the following:    Height as of 6/6/23: 1.734 m (5' 8.25\").    Weight as of 6/6/23: 92.5 kg (203 lb 14.4 oz).           SAUNDRA Aguirre [facility]    Subjective   Ivan is a 28 year old, presenting for the following health issues:  Refill Request and Health Maintenance (Advised patient of .)        6/29/2023    11:12 AM   Additional Questions   Roomed by Jennifer TERRY CMA   Accompanied by self     HPI     Depression and Anxiety Follow-Up    How are you doing with your depression since your last visit? Improved     How are you doing with your anxiety since your last visit?  Improved     Are you having other symptoms that might be associated with depression or anxiety? No    Have you had a significant life event? No     Do you have any concerns with your use of alcohol or other drugs? No    Social History     Tobacco Use     Smoking status: Never     Smokeless tobacco: Never   Vaping Use     Vaping Use: Never used   Substance Use Topics     Alcohol use: No     Drug use: No         3/7/2023     1:28 PM 3/13/2023     1:31 PM 6/29/2023    11:09 AM   PHQ   PHQ-9 Total Score 4 4 2   Q9: Thoughts of " better off dead/self-harm past 2 weeks Not at all Not at all Not at all         3/7/2023     1:28 PM 3/13/2023     1:31 PM 6/23/2023     8:23 AM   LIZZETH-7 SCORE   Total Score 4 (minimal anxiety)  2 (minimal anxiety)   Total Score 4 8 2         6/29/2023    11:09 AM   Last PHQ-9   1.  Little interest or pleasure in doing things 0   2.  Feeling down, depressed, or hopeless 0   3.  Trouble falling or staying asleep, or sleeping too much 1   4.  Feeling tired or having little energy 1   5.  Poor appetite or overeating 0   6.  Feeling bad about yourself 0   7.  Trouble concentrating 0   8.  Moving slowly or restless 0   Q9: Thoughts of better off dead/self-harm past 2 weeks 0   PHQ-9 Total Score 2   Difficulty at work, home, or with people Somewhat difficult         6/23/2023     8:23 AM   LIZZETH-7    1. Feeling nervous, anxious, or on edge 0   2. Not being able to stop or control worrying 0   3. Worrying too much about different things 1   4. Trouble relaxing 1   5. Being so restless that it is hard to sit still 0   6. Becoming easily annoyed or irritable 0   7. Feeling afraid, as if something awful might happen 0   LIZZETH-7 Total Score 2   If you checked any problems, how difficult have they made it for you to do your work, take care of things at home, or get along with other people? Not difficult at all       Suicide Assessment Five-step Evaluation and Treatment (SAFE-T)          Review of Systems   Constitutional, HEENT, cardiovascular, pulmonary, gi and gu systems are negative, except as otherwise noted.      Objective           Vitals:  No vitals were obtained today due to virtual visit.    Physical Exam   healthy, alert and no distress  PSYCH: Alert and oriented times 3; coherent speech, normal   rate and volume, able to articulate logical thoughts, able   to abstract reason, no tangential thoughts, no hallucinations   or delusions  His affect is normal  RESP: No cough, no audible wheezing, able to talk in full  sentences  Remainder of exam unable to be completed due to telephone visits                Phone call duration: 6 minutes

## 2023-07-01 ENCOUNTER — MYC MEDICAL ADVICE (OUTPATIENT)
Dept: FAMILY MEDICINE | Facility: CLINIC | Age: 28
End: 2023-07-01
Payer: COMMERCIAL

## 2023-07-01 DIAGNOSIS — F52.4 PREMATURE EJACULATION: Primary | ICD-10-CM

## 2023-07-03 RX ORDER — PAROXETINE 10 MG/1
20 TABLET, FILM COATED ORAL EVERY MORNING
Qty: 90 TABLET | Refills: 0 | Status: SHIPPED | OUTPATIENT
Start: 2023-07-03 | End: 2023-07-05

## 2023-07-05 RX ORDER — PAROXETINE 10 MG/1
20 TABLET, FILM COATED ORAL EVERY MORNING
Qty: 90 TABLET | Refills: 0 | Status: SHIPPED | OUTPATIENT
Start: 2023-07-05 | End: 2023-09-08

## 2023-07-17 NOTE — TELEPHONE ENCOUNTER
This is the most recent order:     Disp Refills Start End VICK   PARoxetine (PAXIL) 10 MG tablet 90 tablet 0 7/5/2023  No   Sig - Route: Take 2 tablets (20 mg) by mouth every morning - Oral   Sent to pharmacy as: PARoxetine HCl 10 MG Oral Tablet (PAXIL)   Class: E-Prescribe   Order: [id number]   E-Prescribing Status: Receipt confirmed by pharmacy (7/5/2023  1:38 PM CDT)     Printout Tracking    External Result Report     Pharmacy  __________________________________________________________    Zahira Fontenot RN on 7/17/2023 at 8:30 AM

## 2023-08-10 DIAGNOSIS — F33.42 RECURRENT MAJOR DEPRESSIVE DISORDER, IN FULL REMISSION (H): Primary | ICD-10-CM

## 2023-08-10 RX ORDER — PAROXETINE 10 MG/1
20 TABLET, FILM COATED ORAL EVERY MORNING
Qty: 180 TABLET | Refills: 0 | Status: SHIPPED | OUTPATIENT
Start: 2023-08-10 | End: 2023-09-08

## 2023-08-28 ENCOUNTER — MYC MEDICAL ADVICE (OUTPATIENT)
Dept: FAMILY MEDICINE | Facility: CLINIC | Age: 28
End: 2023-08-28
Payer: COMMERCIAL

## 2023-08-30 NOTE — TELEPHONE ENCOUNTER
PSC already added COVID and Flu shot to appt notes.    Flagged back to RN to address med dose issue.

## 2023-08-30 NOTE — TELEPHONE ENCOUNTER
Medication list notes updated to reflect 10 mg daily.     Zahira Fontenot RN on 8/30/2023 at 10:45 AM

## 2023-09-01 ASSESSMENT — ENCOUNTER SYMPTOMS
HEADACHES: 0
ARTHRALGIAS: 0
DIARRHEA: 0
SORE THROAT: 0
FREQUENCY: 0
SHORTNESS OF BREATH: 0
PARESTHESIAS: 0
PALPITATIONS: 0
HEARTBURN: 0
JOINT SWELLING: 0
HEMATOCHEZIA: 0
HEMATURIA: 0
MYALGIAS: 0
COUGH: 0
EYE PAIN: 0
CHILLS: 0
FEVER: 0
DYSURIA: 0
CONSTIPATION: 0
NAUSEA: 0
NERVOUS/ANXIOUS: 0
WEAKNESS: 0
DIZZINESS: 0
ABDOMINAL PAIN: 0

## 2023-09-01 ASSESSMENT — PATIENT HEALTH QUESTIONNAIRE - PHQ9
10. IF YOU CHECKED OFF ANY PROBLEMS, HOW DIFFICULT HAVE THESE PROBLEMS MADE IT FOR YOU TO DO YOUR WORK, TAKE CARE OF THINGS AT HOME, OR GET ALONG WITH OTHER PEOPLE: NOT DIFFICULT AT ALL
SUM OF ALL RESPONSES TO PHQ QUESTIONS 1-9: 0
SUM OF ALL RESPONSES TO PHQ QUESTIONS 1-9: 0

## 2023-09-08 ENCOUNTER — OFFICE VISIT (OUTPATIENT)
Dept: FAMILY MEDICINE | Facility: CLINIC | Age: 28
End: 2023-09-08
Payer: COMMERCIAL

## 2023-09-08 VITALS
HEART RATE: 67 BPM | SYSTOLIC BLOOD PRESSURE: 114 MMHG | WEIGHT: 214.8 LBS | TEMPERATURE: 97.6 F | DIASTOLIC BLOOD PRESSURE: 80 MMHG | RESPIRATION RATE: 16 BRPM | OXYGEN SATURATION: 98 % | BODY MASS INDEX: 32.55 KG/M2 | HEIGHT: 68 IN

## 2023-09-08 DIAGNOSIS — F33.42 RECURRENT MAJOR DEPRESSIVE DISORDER, IN FULL REMISSION (H): ICD-10-CM

## 2023-09-08 DIAGNOSIS — Z23 NEED FOR PROPHYLACTIC VACCINATION AND INOCULATION AGAINST INFLUENZA: ICD-10-CM

## 2023-09-08 DIAGNOSIS — F41.9 ANXIETY: ICD-10-CM

## 2023-09-08 DIAGNOSIS — F52.4 PREMATURE EJACULATION: ICD-10-CM

## 2023-09-08 DIAGNOSIS — Z13.1 SCREENING FOR DIABETES MELLITUS: ICD-10-CM

## 2023-09-08 DIAGNOSIS — Z00.00 ROUTINE GENERAL MEDICAL EXAMINATION AT A HEALTH CARE FACILITY: Primary | ICD-10-CM

## 2023-09-08 DIAGNOSIS — Z13.220 SCREENING FOR HYPERLIPIDEMIA: ICD-10-CM

## 2023-09-08 LAB
CHOLEST SERPL-MCNC: 222 MG/DL
FASTING STATUS PATIENT QL REPORTED: YES
GLUCOSE SERPL-MCNC: 99 MG/DL (ref 70–99)
HDLC SERPL-MCNC: 45 MG/DL
LDLC SERPL CALC-MCNC: 154 MG/DL
NONHDLC SERPL-MCNC: 177 MG/DL
TRIGL SERPL-MCNC: 117 MG/DL

## 2023-09-08 PROCEDURE — 99395 PREV VISIT EST AGE 18-39: CPT | Mod: 25 | Performed by: NURSE PRACTITIONER

## 2023-09-08 PROCEDURE — 36415 COLL VENOUS BLD VENIPUNCTURE: CPT | Performed by: NURSE PRACTITIONER

## 2023-09-08 PROCEDURE — 90686 IIV4 VACC NO PRSV 0.5 ML IM: CPT | Performed by: NURSE PRACTITIONER

## 2023-09-08 PROCEDURE — 82947 ASSAY GLUCOSE BLOOD QUANT: CPT | Performed by: NURSE PRACTITIONER

## 2023-09-08 PROCEDURE — 90471 IMMUNIZATION ADMIN: CPT | Performed by: NURSE PRACTITIONER

## 2023-09-08 PROCEDURE — 80061 LIPID PANEL: CPT | Performed by: NURSE PRACTITIONER

## 2023-09-08 PROCEDURE — 99213 OFFICE O/P EST LOW 20 MIN: CPT | Mod: 25 | Performed by: NURSE PRACTITIONER

## 2023-09-08 RX ORDER — PAROXETINE 10 MG/1
10 TABLET, FILM COATED ORAL EVERY MORNING
Start: 2023-09-08 | End: 2024-04-01

## 2023-09-08 ASSESSMENT — ENCOUNTER SYMPTOMS
NAUSEA: 0
PALPITATIONS: 0
CONSTIPATION: 0
ABDOMINAL PAIN: 0
SORE THROAT: 0
DYSURIA: 0
FEVER: 0
WEAKNESS: 0
NERVOUS/ANXIOUS: 0
COUGH: 0
MYALGIAS: 0
DIARRHEA: 0
EYE PAIN: 0
HEARTBURN: 0
FREQUENCY: 0
HEADACHES: 0
ARTHRALGIAS: 0
CHILLS: 0
HEMATURIA: 0
JOINT SWELLING: 0
PARESTHESIAS: 0
DIZZINESS: 0
SHORTNESS OF BREATH: 0
HEMATOCHEZIA: 0

## 2023-09-08 ASSESSMENT — PAIN SCALES - GENERAL: PAINLEVEL: NO PAIN (0)

## 2023-09-08 NOTE — PROGRESS NOTES
SUBJECTIVE:   CC: Ivan is an 28 year old who presents for preventative health visit.       9/8/2023     9:05 AM   Additional Questions   Roomed by Maria R ELLIS MA   Accompanied by Self       Healthy Habits:     Getting at least 3 servings of Calcium per day:  Yes    Bi-annual eye exam:  Yes    Dental care twice a year:  NO    Sleep apnea or symptoms of sleep apnea:  None    Diet:  Regular (no restrictions)    Frequency of exercise:  6-7 days/week    Duration of exercise:  30-45 minutes    Taking medications regularly:  Yes    Medication side effects:  None    Additional concerns today:  Yes    Has been on Paxil and mood is doing well.     Today's PHQ-9 Score:       9/1/2023     9:14 AM   PHQ-9 SCORE   PHQ-9 Total Score MyChart 0   PHQ-9 Total Score 0         Social History     Tobacco Use    Smoking status: Never    Smokeless tobacco: Never   Substance Use Topics    Alcohol use: No             9/1/2023     9:17 AM   Alcohol Use   Prescreen: >3 drinks/day or >7 drinks/week? Not Applicable       Last PSA: No results found for: PSA    Reviewed orders with patient. Reviewed health maintenance and updated orders accordingly - Yes  Lab work is in process  Labs reviewed in EPIC  BP Readings from Last 3 Encounters:   09/08/23 114/80   06/06/23 110/74   11/07/22 (!) 138/91    Wt Readings from Last 3 Encounters:   09/08/23 97.4 kg (214 lb 12.8 oz)   06/06/23 92.5 kg (203 lb 14.4 oz)   11/07/22 92.5 kg (204 lb)                  Patient Active Problem List   Diagnosis    Seasonal allergic rhinitis    LIZZETH (generalized anxiety disorder)    Moderate episode of recurrent major depressive disorder (H)    Current moderate episode of major depressive disorder (H)    Anxiety     No past surgical history on file.    Social History     Tobacco Use    Smoking status: Never    Smokeless tobacco: Never   Substance Use Topics    Alcohol use: No     Family History   Problem Relation Age of Onset    Hypertension Mother     Hypertension Father      Allergies Father     Alzheimer Disease Maternal Grandmother     Arthritis Maternal Grandfather     Heart Disease Paternal Grandmother     Arthritis Paternal Grandmother          Current Outpatient Medications   Medication Sig Dispense Refill    PARoxetine (PAXIL) 10 MG tablet Take 2 tablets (20 mg) by mouth every morning (Patient taking differently: Take 10 mg by mouth every morning) 180 tablet 3     No Known Allergies  Recent Labs   Lab Test 09/23/22  0932   *   HDL 40   TRIG 245*   ALT 37   CR 0.97   GFRESTIMATED >90   POTASSIUM 4.2   TSH 1.70        Reviewed and updated as needed this visit by clinical staff   Tobacco  Allergies  Meds              Reviewed and updated as needed this visit by Provider                 Past Medical History:   Diagnosis Date    Allergic rhinitis due to other allergen     Depressive disorder Not sure    Christel been depressed for quite a long time, and have been on medication in the past    Moderate persistent asthma 04/18/2005      No past surgical history on file.  OB History   No obstetric history on file.       Review of Systems   Constitutional:  Negative for chills and fever.   HENT:  Negative for congestion, ear pain, hearing loss and sore throat.    Eyes:  Negative for pain and visual disturbance.   Respiratory:  Negative for cough and shortness of breath.    Cardiovascular:  Negative for chest pain, palpitations and peripheral edema.   Gastrointestinal:  Negative for abdominal pain, constipation, diarrhea, heartburn, hematochezia and nausea.   Genitourinary:  Negative for dysuria, frequency, genital sores, hematuria, impotence, penile discharge and urgency.   Musculoskeletal:  Negative for arthralgias, joint swelling and myalgias.   Skin:  Negative for rash.   Neurological:  Negative for dizziness, weakness, headaches and paresthesias.   Psychiatric/Behavioral:  Negative for mood changes. The patient is not nervous/anxious.      OBJECTIVE:   /80 (BP Location:  "Right arm, Patient Position: Chair, Cuff Size: Adult Large)   Pulse 67   Temp 97.6  F (36.4  C) (Tympanic)   Resp 16   Ht 1.734 m (5' 8.25\")   Wt 97.4 kg (214 lb 12.8 oz)   SpO2 98%   BMI 32.42 kg/m      Physical Exam  GENERAL: healthy, alert and no distress  EYES: Eyes grossly normal to inspection, PERRL and conjunctivae and sclerae normal  HENT: ear canals small amount of wax present and TM's normal, nose and mouth without ulcers or lesions  NECK: no adenopathy, no asymmetry, masses, or scars and thyroid normal to palpation  RESP: lungs clear to auscultation - no rales, rhonchi or wheezes  CV: regular rate and rhythm, normal S1 S2, no S3 or S4, no murmur, click or rub, no peripheral edema and peripheral pulses strong  ABDOMEN: soft, nontender, no hepatosplenomegaly, no masses and bowel sounds normal  MS: no gross musculoskeletal defects noted, no edema  SKIN: no suspicious lesions or rashes  NEURO: Normal strength and tone, mentation intact and speech normal  PSYCH: mentation appears normal, affect normal/bright    ASSESSMENT/PLAN:       ICD-10-CM    1. Routine general medical examination at a health care facility  Z00.00       2. Recurrent major depressive disorder, in full remission (H)  F33.42 PARoxetine (PAXIL) 10 MG tablet      3. Anxiety  F41.9 PARoxetine (PAXIL) 10 MG tablet      4. Premature ejaculation  F52.4 PARoxetine (PAXIL) 10 MG tablet      5. Screening for hyperlipidemia  Z13.220 Lipid panel reflex to direct LDL Fasting     Lipid panel reflex to direct LDL Fasting      6. Screening for diabetes mellitus  Z13.1 Glucose     Glucose      7. Need for prophylactic vaccination and inoculation against influenza  Z23         Healthy Male exam completed today.  I discussed preventative measures with the patient including continuing with lifestyle modifications of a balanced diet and regular cardiovascular exercise.  I discussed self testicular exams and how to complete these.  I encouraged patient to " "follow-up yearly for annual physicals and sooner as needed.    Doing well on paxil. Stable at 10mg. Has had some weight gain. Recommend monitoring for further gain. Consider changing medication if needed.       Patient has been advised of split billing requirements and indicates understanding: Yes      COUNSELING:   Reviewed preventive health counseling, as reflected in patient instructions       Regular exercise       Healthy diet/nutrition       Safe sex practices/STD prevention      BMI:   Estimated body mass index is 32.42 kg/m  as calculated from the following:    Height as of this encounter: 1.734 m (5' 8.25\").    Weight as of this encounter: 97.4 kg (214 lb 12.8 oz).   Weight management plan: Discussed healthy diet and exercise guidelines      He reports that he has never smoked. He has never used smokeless tobacco.            SAUNDRA Aguirre CNP  [facility]  "

## 2023-09-19 ENCOUNTER — MYC MEDICAL ADVICE (OUTPATIENT)
Dept: FAMILY MEDICINE | Facility: CLINIC | Age: 28
End: 2023-09-19
Payer: COMMERCIAL

## 2024-01-11 ENCOUNTER — MYC MEDICAL ADVICE (OUTPATIENT)
Dept: FAMILY MEDICINE | Facility: CLINIC | Age: 29
End: 2024-01-11
Payer: COMMERCIAL

## 2024-01-11 DIAGNOSIS — N48.89 PENILE PAIN: Primary | ICD-10-CM

## 2024-01-17 NOTE — TELEPHONE ENCOUNTER
Subsequent my chart message regarding penile pain    Zahira TONJA Fontenot RN on 1/17/2024 at 11:33 AM

## 2024-01-23 ENCOUNTER — OFFICE VISIT (OUTPATIENT)
Dept: UROLOGY | Facility: CLINIC | Age: 29
End: 2024-01-23
Attending: NURSE PRACTITIONER
Payer: COMMERCIAL

## 2024-01-23 VITALS
RESPIRATION RATE: 18 BRPM | BODY MASS INDEX: 33.96 KG/M2 | HEART RATE: 77 BPM | WEIGHT: 225 LBS | OXYGEN SATURATION: 100 % | TEMPERATURE: 97 F | SYSTOLIC BLOOD PRESSURE: 132 MMHG | DIASTOLIC BLOOD PRESSURE: 88 MMHG

## 2024-01-23 DIAGNOSIS — N50.89 SCROTAL IRRITATION: Primary | ICD-10-CM

## 2024-01-23 DIAGNOSIS — N48.89 PENILE PAIN: ICD-10-CM

## 2024-01-23 LAB
ALBUMIN UR-MCNC: NEGATIVE MG/DL
APPEARANCE UR: CLEAR
BILIRUB UR QL STRIP: NEGATIVE
COLOR UR AUTO: YELLOW
GLUCOSE UR STRIP-MCNC: NEGATIVE MG/DL
HGB UR QL STRIP: ABNORMAL
KETONES UR STRIP-MCNC: NEGATIVE MG/DL
LEUKOCYTE ESTERASE UR QL STRIP: NEGATIVE
NITRATE UR QL: NEGATIVE
PH UR STRIP: 6 [PH] (ref 5–7)
RBC #/AREA URNS AUTO: ABNORMAL /HPF
SP GR UR STRIP: <=1.005 (ref 1–1.03)
SPERM #/AREA URNS HPF: PRESENT /HPF
UROBILINOGEN UR STRIP-ACNC: 0.2 E.U./DL
WBC #/AREA URNS AUTO: ABNORMAL /HPF

## 2024-01-23 PROCEDURE — 81001 URINALYSIS AUTO W/SCOPE: CPT | Performed by: STUDENT IN AN ORGANIZED HEALTH CARE EDUCATION/TRAINING PROGRAM

## 2024-01-23 PROCEDURE — 99213 OFFICE O/P EST LOW 20 MIN: CPT | Performed by: STUDENT IN AN ORGANIZED HEALTH CARE EDUCATION/TRAINING PROGRAM

## 2024-01-23 RX ORDER — CLOTRIMAZOLE AND BETAMETHASONE DIPROPIONATE 10; .64 MG/G; MG/G
CREAM TOPICAL 2 TIMES DAILY
Qty: 15 G | Refills: 0 | Status: SHIPPED | OUTPATIENT
Start: 2024-01-23 | End: 2024-03-10

## 2024-01-23 NOTE — NURSING NOTE
Chief Complaint   Patient presents with    Consult     Appt per pt. Penile pain [N48.89], Charisse Hernandez, APRN CNP in  [facility]. Recs in [facility]. Location verified.       Vitals:    01/23/24 1011   BP: (!) 144/97   BP Location: Right arm   Patient Position: Sitting   Cuff Size: Adult Large   Pulse: 77   Resp: 18   Temp: 97  F (36.1  C)   TempSrc: Tympanic   SpO2: 100%   Weight: 102.1 kg (225 lb)     Wt Readings from Last 1 Encounters:   01/23/24 102.1 kg (225 lb)     Radha Biggs MA

## 2024-01-23 NOTE — PROGRESS NOTES
UROLOGY FOLLOW-UP NOTE          Chief Complaint:   Today I had the pleasure of seeing . Ivan Mcguire in follow-up for a chief complaint of penile pain.          Interval Update   Ivan Mcguire is a very pleasant 29 year old male with a history of major depressive disorder and generalized anxiety disorder.     Brief History: . Ivan Mcguire was seen in November 2022 for penile pain and urinary frequency after sexual activity with a new partner. He was treated with a short course of antibiotics for urethritis and his symptoms improved.     Today's notes: The patient reports penile injury with masturbation about two weeks ago. He reports consistent penile discomfort since this time. He has tried ice and ibuprofen, which has been somewhat helpful. He had some difficulty getting an erection, though this has improved.     He does not think it as significant as last year. He denies bothersome urinary symptoms.          Physical Exam:   Patient is a 29 year old  male   Vitals: Blood pressure 132/88, pulse 77, temperature 97  F (36.1  C), temperature source Tympanic, resp. rate 18, weight 102.1 kg (225 lb), SpO2 100%.  General: Alert and oriented x 3, no acute distress.  Respiratory: Non-labored breathing.  Cardiac: Regular rate.  : no palpable testicular masses, erythema of scrotum, no penile masses or abnormalities      Labs and Pathology:    I personally reviewed all applicable laboratory data and went over findings with patient  Significant for:    CBC RESULTS:  Recent Labs   Lab Test 09/23/22  0932   WBC 5.4   HGB 15.9           BMP RESULTS:  Recent Labs   Lab Test 09/08/23  0953 09/23/22  0932   NA  --  141   POTASSIUM  --  4.2   CHLORIDE  --  100   CO2  --  26   ANIONGAP  --  15   GLC 99 102*   BUN  --  12.1   CR  --  0.97   GFRESTIMATED  --  >90   JOHNNY  --  9.3       UA RESULTS:   Recent Labs   Lab Test 11/07/22  0858 11/03/22  2140   SG <=1.005 1.020   URINEPH 6.0 6.0   NITRITE Negative Negative   RBCU   --  5*   WBCU  --  1            Assessment/Plan   29 year old male seen in evaluation for penile pain following masturbation about two weeks ago. His pain has been ongoing since. On exam, there is some erythema of the scrotum without other abnormalities of the penis and testicles. The patient was reassured. He will use a short course of Lotrisone cream BID for the redness and skin irritation.     Plan:  UA today.   Continue conservative pain management with ice/heat and ibuprofen prn.   Lotrisone cream BID x 7 days for scrotal irritation. Patient will contact me if symptoms are persistent.            Past Medical History:     Past Medical History:   Diagnosis Date    Allergic rhinitis due to other allergen     Depressive disorder Not sure    Christel been depressed for quite a long time, and have been on medication in the past    Moderate persistent asthma 04/18/2005            Past Surgical History:   No past surgical history on file.         Medications     Current Outpatient Medications   Medication    PARoxetine (PAXIL) 10 MG tablet     No current facility-administered medications for this visit.            Family History:     Family History   Problem Relation Age of Onset    Hypertension Mother     Hypertension Father     Allergies Father     Alzheimer Disease Maternal Grandmother     Arthritis Maternal Grandfather     Heart Disease Paternal Grandmother     Arthritis Paternal Grandmother             Social History:     Social History     Socioeconomic History    Marital status: Single     Spouse name: Not on file    Number of children: Not on file    Years of education: Not on file    Highest education level: Not on file   Occupational History    Not on file   Tobacco Use    Smoking status: Never    Smokeless tobacco: Never   Vaping Use    Vaping Use: Never used   Substance and Sexual Activity    Alcohol use: No    Drug use: No    Sexual activity: Yes     Partners: Female     Birth control/protection: Inserts/Ring    Other Topics Concern    Parent/sibling w/ CABG, MI or angioplasty before 65F 55M? No   Social History Narrative    Not on file     Social Determinants of Health     Financial Resource Strain: Not on file   Food Insecurity: Not on file   Transportation Needs: Not on file   Physical Activity: Not on file   Stress: Not on file   Social Connections: Not on file   Interpersonal Safety: Not on file   Housing Stability: Not on file            Allergies:   Patient has no known allergies.         Review of Systems:  From intake questionnaire   Negative 14 system review except as noted on HPI, nurse's note.        RAYMOND MEJIA PA-C  Department of Urology

## 2024-03-09 ENCOUNTER — NURSE TRIAGE (OUTPATIENT)
Dept: NURSING | Facility: CLINIC | Age: 29
End: 2024-03-09
Payer: COMMERCIAL

## 2024-03-09 NOTE — TELEPHONE ENCOUNTER
.Nurse Triage SBAR    Is this a 2nd Level Triage? NO    Situation: pt is calling due to severe penis pain.  It has been this way since he masterbated last Tues.  The veins are larger.  Pain is at 8/10.    Able to urinate. No blood in urine.     Protocol Recommended Disposition:   Go to ED Now (Or PCP Triage)    Recommendation: ER      Reason for Disposition   SEVERE pain (e.g., excruciating)    Additional Information   Negative: Followed a genital area injury (e.g., penis, scrotum)   Negative: Pain or burning with passing urine is main symptom   Negative: Pain in scrotum or testicle is main symptom   Negative: Swollen scrotum OR lump in the scrotum/groin area   Negative: Pubic lice suspected   Negative: [1] Blood from end of penis AND [2] large amount   Negative: [1] Not circumcised AND [2] foreskin pulled back and stuck   Negative: [1] Looks infected (e.g., draining sore, ulcer, rash is painful to touch) AND [2] fever > 100.4 F (38.0 C)   Negative: [1] Unable to urinate (or only a few drops) > 4 hours AND [2] bladder feels very full (e.g., palpable bladder or strong urge to urinate)   Negative: [1] Prolonged unwanted erection (i.e., not related to sexual interest) AND [2] lasting > 4 hours    Protocols used: Penis and Scrotum Symptoms-[facility]-    Masha Gann RN on 3/9/2024 at 4:49 PM

## 2024-03-10 ENCOUNTER — OFFICE VISIT (OUTPATIENT)
Dept: URGENT CARE | Facility: URGENT CARE | Age: 29
End: 2024-03-10
Payer: COMMERCIAL

## 2024-03-10 VITALS
OXYGEN SATURATION: 99 % | RESPIRATION RATE: 16 BRPM | HEART RATE: 68 BPM | TEMPERATURE: 97.8 F | DIASTOLIC BLOOD PRESSURE: 90 MMHG | SYSTOLIC BLOOD PRESSURE: 139 MMHG

## 2024-03-10 DIAGNOSIS — N48.89 PENIS PAIN: Primary | ICD-10-CM

## 2024-03-10 PROCEDURE — 99213 OFFICE O/P EST LOW 20 MIN: CPT | Performed by: PHYSICIAN ASSISTANT

## 2024-03-10 NOTE — PATIENT INSTRUCTIONS
Please follow up with urology for persistent symptoms.    Ice, elevation. Tylenol or ibuprofen for pain.  Ibuprofen 800 mg up to 3 x per day.

## 2024-03-10 NOTE — PROGRESS NOTES
Assessment & Plan     Penis pain  I reassured pt I do not see any concerns on exam including no erythema, palpable mass, nodule or ecchymosis.  Vein is compressible and without mass or obvious thrombophlebitis.  I would recommend pt follow-up with urology for persistent sx but may continue with ibuprofen, elevation, ice.    Pt agreeable with plan.        ELAINE [facility] D[facility]I Love is a 29 year old male who presents to clinic today for the following health issues:  Chief Complaint   Patient presents with    Groin Pain     6 days, sx happens after masturbasting penis-redness, painful, warm to touch     HPI    Since masturbation last week has had generalized discomfort in the shaft of the penis.  No fevers. Has noted some redness and warmth as well as a prominent vein. No masses. No scrotal pain or swelling.   No dysuria frequency, urgency or hematuria.    Was seen for similar by urology in Jan 2024 with recommendation to try antifungal for some mild redness. Some difficulty achieving erection.        Review of Systems  Constitutional, HEENT, cardiovascular, pulmonary, gi and gu systems are negative, except as otherwise noted.      Patient Active Problem List   Diagnosis    Seasonal allergic rhinitis    LIZZETH (generalized anxiety disorder)    Moderate episode of recurrent major depressive disorder (H)    Current moderate episode of major depressive disorder (H)    Anxiety       Objective    BP (!) 139/90   Pulse 68   Temp 97.8  F (36.6  C)   Resp 16   SpO2 99%   Physical Exam   Nad appears well  Exam of the genital area reveals EG that of normal young male corrie stage 5.    There is no erythema, inflammation or swelling.    Testicles are decended B, nontender, no masses.   Urethra midline.   No erythema or swelling of the shaft of the penis but there is a prominent dorsal vein without palpable mass or nodule, easily compressible, no erythema or warmth.    No  significant pain with palpation.

## 2024-03-20 ENCOUNTER — MYC MEDICAL ADVICE (OUTPATIENT)
Dept: FAMILY MEDICINE | Facility: CLINIC | Age: 29
End: 2024-03-20
Payer: COMMERCIAL

## 2024-03-21 ENCOUNTER — E-VISIT (OUTPATIENT)
Dept: FAMILY MEDICINE | Facility: CLINIC | Age: 29
End: 2024-03-21
Payer: COMMERCIAL

## 2024-03-21 DIAGNOSIS — N48.89 PENILE PAIN: Primary | ICD-10-CM

## 2024-03-21 PROCEDURE — 99207 PR NON-BILLABLE SERV PER CHARTING: CPT | Performed by: NURSE PRACTITIONER

## 2024-03-21 NOTE — PATIENT INSTRUCTIONS
Thank you for choosing us for your care. Based on your symptoms and length of illness, I do not think that you need a prescription at this time.  Please follow the care advise I've provided and use the over the counter medications to help relieve your symptoms.   View your full visit summary for details by clicking on the link below.     If you're not feeling better within 2-3 days, please respond to this message and we can consider if a prescription is needed.  You can schedule an appointment right here in [facility], or call [phone]  If the visit is for the same symptoms as your eVisit, we'll refund the cost of your eVisit if seen within seven days.

## 2024-03-22 ENCOUNTER — TELEPHONE (OUTPATIENT)
Dept: UROLOGY | Facility: CLINIC | Age: 29
End: 2024-03-22
Payer: COMMERCIAL

## 2024-03-22 NOTE — TELEPHONE ENCOUNTER
Patient scheduled Monday for the appointment with PA.  (Does not do procedures)    No Urology provider in [place] today.     Left msg letting pt know and can call back if would like the message sent to  [place] offices for consideration of an Urgent Appt or possibly if requires seeing a higher level provider for more immediate intervention.  Otherwise could consider going to [facility] where on call Urologist would be more readily available for procedure intervention if becomes emergent.      Lilliam MART   [facility]

## 2024-03-22 NOTE — TELEPHONE ENCOUNTER
Received a return call and does request a message be sent to [place] appt navigator for sooner appt consideration and if should be seen by higher level provider vs waiting for PA appt on Monday.      Lilliam MART   [facility]

## 2024-03-22 NOTE — TELEPHONE ENCOUNTER
Health Call Center    Phone Message    May a detailed message be left on voicemail: yes     Reason for Call: Appointment Intake    Referring Provider Name: self  Diagnosis and/or Symptoms: Pt is scheduled for first available, but would like to be seen sooner as he is in constant penile pain for the last 2 weeks - it's firm and tender/large vein. Please call if you can get him in sooner. He was in urgent care and to his PCP and was told to see urologist. Thank you!    Action Taken: Message routed to:  [facility]    Travel Screening: Not Applicable

## 2024-03-25 ENCOUNTER — OFFICE VISIT (OUTPATIENT)
Dept: UROLOGY | Facility: CLINIC | Age: 29
End: 2024-03-25
Payer: COMMERCIAL

## 2024-03-25 VITALS
SYSTOLIC BLOOD PRESSURE: 145 MMHG | DIASTOLIC BLOOD PRESSURE: 93 MMHG | TEMPERATURE: 97.5 F | HEIGHT: 68 IN | BODY MASS INDEX: 34.46 KG/M2 | HEART RATE: 77 BPM | OXYGEN SATURATION: 99 % | WEIGHT: 227.4 LBS

## 2024-03-25 DIAGNOSIS — N48.89 PENILE PAIN: Primary | ICD-10-CM

## 2024-03-25 PROCEDURE — 99213 OFFICE O/P EST LOW 20 MIN: CPT | Performed by: STUDENT IN AN ORGANIZED HEALTH CARE EDUCATION/TRAINING PROGRAM

## 2024-03-25 ASSESSMENT — PAIN SCALES - GENERAL: PAINLEVEL: SEVERE PAIN (6)

## 2024-03-25 NOTE — PROGRESS NOTES
"    UROLOGY FOLLOW-UP NOTE          Chief Complaint:   Today I had the pleasure of seeing Mr. Ivan Mcguire in follow-up for a chief complaint of penile pain.          Interval Update   Ivan Mcguire is a very pleasant 29 year old male with a history of major depressive disorder and generalized anxiety disorder.    Brief History: Mr. Ivan Mcguire has been seen previously for penile pain after sexual activity. These resolved without intervention.     Today's notes: He notes a prominent vein on his penis that has been present for about two weeks. He notes some discomfort where the vein is located.          Physical Exam:   Patient is a 29 year old  male   Vitals: Blood pressure (!) 152/87, pulse 77, temperature 97.5  F (36.4  C), temperature source Tympanic, height 1.736 m (5' 8.33\"), weight 103.1 kg (227 lb 6.4 oz), SpO2 99%.  General: Alert and oriented x 3, no acute distress.  Respiratory: Non-labored breathing.  Cardiac: Regular rate.  : prominent dorsal vein, no penile rash, edema, or erythema        Labs and Pathology:    I personally reviewed all applicable laboratory data and went over findings with patient  Significant for:    CBC RESULTS:  Recent Labs   Lab Test 09/23/22  0932   WBC 5.4   HGB 15.9           BMP RESULTS:  Recent Labs   Lab Test 09/08/23  0953 09/23/22  0932   NA  --  141   POTASSIUM  --  4.2   CHLORIDE  --  100   CO2  --  26   ANIONGAP  --  15   GLC 99 102*   BUN  --  12.1   CR  --  0.97   GFRESTIMATED  --  >90   JOHNNY  --  9.3       UA RESULTS:   Recent Labs   Lab Test 01/23/24  1028 11/07/22  0858 11/03/22  2140   SG <=1.005 <=1.005 1.020   URINEPH 6.0 6.0 6.0   NITRITE Negative Negative Negative   RBCU 0-2  --  5*   WBCU 0-5  --  1            Assessment/Plan   29 year old male seen in evaluation for a prominent penile vein that has been present for about two weeks. There is some discomfort where the vein is located.     On exam, there is prominence of the dorsal vein without rash, " erythema, or edema. We discussed that this could represent penile mondor disease. This is a benign condition that should resolve with intervention. He could try a warm compress and OTC NSAIDs. We discussed that some studies have shown benefit with topical NSAIDs. We discussed OTC Voltaren if desired since there is no open skin.     Plan:  Follow up with urology as needed.            Past Medical History:     Past Medical History:   Diagnosis Date    Allergic rhinitis due to other allergen     Depressive disorder Not sure    Christel been depressed for quite a long time, and have been on medication in the past    Moderate persistent asthma 04/18/2005            Past Surgical History:   No past surgical history on file.         Medications     Current Outpatient Medications   Medication    PARoxetine (PAXIL) 10 MG tablet     No current facility-administered medications for this visit.            Family History:     Family History   Problem Relation Age of Onset    Hypertension Mother     Hypertension Father     Allergies Father     Alzheimer Disease Maternal Grandmother     Arthritis Maternal Grandfather     Heart Disease Paternal Grandmother     Arthritis Paternal Grandmother             Social History:     Social History     Socioeconomic History    Marital status: Single     Spouse name: Not on file    Number of children: Not on file    Years of education: Not on file    Highest education level: Not on file   Occupational History    Not on file   Tobacco Use    Smoking status: Never    Smokeless tobacco: Never   Vaping Use    Vaping Use: Never used   Substance and Sexual Activity    Alcohol use: No    Drug use: No    Sexual activity: Yes     Partners: Female     Birth control/protection: Inserts/Ring   Other Topics Concern    Parent/sibling w/ CABG, MI or angioplasty before 65F 55M? No   Social History Narrative    Not on file     Social Determinants of Health     Financial Resource Strain: Not on file   Food  Insecurity: Not on file   Transportation Needs: Not on file   Physical Activity: Not on file   Stress: Not on file   Social Connections: Not on file   Interpersonal Safety: Not on file   Housing Stability: Not on file            Allergies:   Patient has no known allergies.         Review of Systems:  From intake questionnaire   Negative 14 system review except as noted on HPI, nurse's note.        RAYMOND MEJIA PA-C  Department of Urology

## 2024-03-31 ENCOUNTER — MYC MEDICAL ADVICE (OUTPATIENT)
Dept: FAMILY MEDICINE | Facility: CLINIC | Age: 29
End: 2024-03-31
Payer: COMMERCIAL

## 2024-03-31 DIAGNOSIS — F33.42 RECURRENT MAJOR DEPRESSIVE DISORDER, IN FULL REMISSION (H): ICD-10-CM

## 2024-03-31 DIAGNOSIS — F41.9 ANXIETY: ICD-10-CM

## 2024-03-31 DIAGNOSIS — F52.4 PREMATURE EJACULATION: ICD-10-CM

## 2024-04-01 ENCOUNTER — MYC REFILL (OUTPATIENT)
Dept: FAMILY MEDICINE | Facility: CLINIC | Age: 29
End: 2024-04-01
Payer: COMMERCIAL

## 2024-04-01 DIAGNOSIS — F52.4 PREMATURE EJACULATION: ICD-10-CM

## 2024-04-01 DIAGNOSIS — F33.42 RECURRENT MAJOR DEPRESSIVE DISORDER, IN FULL REMISSION (H): ICD-10-CM

## 2024-04-01 DIAGNOSIS — F41.9 ANXIETY: ICD-10-CM

## 2024-04-01 RX ORDER — PAROXETINE 20 MG/1
20 TABLET, FILM COATED ORAL EVERY MORNING
Qty: 90 TABLET | Refills: 1 | Status: SHIPPED | OUTPATIENT
Start: 2024-04-01 | End: 2024-06-17

## 2024-04-01 RX ORDER — PAROXETINE 10 MG/1
10 TABLET, FILM COATED ORAL EVERY MORNING
OUTPATIENT
Start: 2024-04-01

## 2024-04-03 ENCOUNTER — MYC REFILL (OUTPATIENT)
Dept: FAMILY MEDICINE | Facility: CLINIC | Age: 29
End: 2024-04-03
Payer: COMMERCIAL

## 2024-04-03 DIAGNOSIS — F41.9 ANXIETY: ICD-10-CM

## 2024-04-03 DIAGNOSIS — F52.4 PREMATURE EJACULATION: ICD-10-CM

## 2024-04-03 DIAGNOSIS — F33.42 RECURRENT MAJOR DEPRESSIVE DISORDER, IN FULL REMISSION (H): ICD-10-CM

## 2024-04-03 RX ORDER — PAROXETINE 20 MG/1
20 TABLET, FILM COATED ORAL EVERY MORNING
Qty: 90 TABLET | Refills: 1 | Status: CANCELLED | OUTPATIENT
Start: 2024-04-03

## 2024-06-15 ENCOUNTER — MYC MEDICAL ADVICE (OUTPATIENT)
Dept: FAMILY MEDICINE | Facility: CLINIC | Age: 29
End: 2024-06-15
Payer: COMMERCIAL

## 2024-06-15 DIAGNOSIS — F41.9 ANXIETY: ICD-10-CM

## 2024-06-15 DIAGNOSIS — F33.42 RECURRENT MAJOR DEPRESSIVE DISORDER, IN FULL REMISSION (H): ICD-10-CM

## 2024-06-15 DIAGNOSIS — F52.4 PREMATURE EJACULATION: ICD-10-CM

## 2024-06-18 ASSESSMENT — PATIENT HEALTH QUESTIONNAIRE - PHQ9
SUM OF ALL RESPONSES TO PHQ QUESTIONS 1-9: 5
SUM OF ALL RESPONSES TO PHQ QUESTIONS 1-9: 5
10. IF YOU CHECKED OFF ANY PROBLEMS, HOW DIFFICULT HAVE THESE PROBLEMS MADE IT FOR YOU TO DO YOUR WORK, TAKE CARE OF THINGS AT HOME, OR GET ALONG WITH OTHER PEOPLE: NOT DIFFICULT AT ALL

## 2024-06-18 ASSESSMENT — ANXIETY QUESTIONNAIRES
1. FEELING NERVOUS, ANXIOUS, OR ON EDGE: NEARLY EVERY DAY
GAD7 TOTAL SCORE: 12
5. BEING SO RESTLESS THAT IT IS HARD TO SIT STILL: SEVERAL DAYS
3. WORRYING TOO MUCH ABOUT DIFFERENT THINGS: NEARLY EVERY DAY
2. NOT BEING ABLE TO STOP OR CONTROL WORRYING: NEARLY EVERY DAY
7. FEELING AFRAID AS IF SOMETHING AWFUL MIGHT HAPPEN: NOT AT ALL
8. IF YOU CHECKED OFF ANY PROBLEMS, HOW DIFFICULT HAVE THESE MADE IT FOR YOU TO DO YOUR WORK, TAKE CARE OF THINGS AT HOME, OR GET ALONG WITH OTHER PEOPLE?: SOMEWHAT DIFFICULT
6. BECOMING EASILY ANNOYED OR IRRITABLE: NOT AT ALL
7. FEELING AFRAID AS IF SOMETHING AWFUL MIGHT HAPPEN: NOT AT ALL
GAD7 TOTAL SCORE: 12
IF YOU CHECKED OFF ANY PROBLEMS ON THIS QUESTIONNAIRE, HOW DIFFICULT HAVE THESE PROBLEMS MADE IT FOR YOU TO DO YOUR WORK, TAKE CARE OF THINGS AT HOME, OR GET ALONG WITH OTHER PEOPLE: SOMEWHAT DIFFICULT
GAD7 TOTAL SCORE: 12
4. TROUBLE RELAXING: MORE THAN HALF THE DAYS

## 2024-06-19 RX ORDER — PAROXETINE 20 MG/1
20 TABLET, FILM COATED ORAL EVERY MORNING
Qty: 90 TABLET | Refills: 1 | Status: SHIPPED | OUTPATIENT
Start: 2024-06-19

## 2024-06-19 NOTE — TELEPHONE ENCOUNTER
Routing to refill pool. Patient completed required forms for refills.    Gatito ARMIJO RN  [facility]

## 2024-06-22 ENCOUNTER — OFFICE VISIT (OUTPATIENT)
Dept: URGENT CARE | Facility: URGENT CARE | Age: 29
End: 2024-06-22
Payer: COMMERCIAL

## 2024-06-22 VITALS
BODY MASS INDEX: 34.32 KG/M2 | SYSTOLIC BLOOD PRESSURE: 131 MMHG | WEIGHT: 227.9 LBS | DIASTOLIC BLOOD PRESSURE: 87 MMHG | HEART RATE: 87 BPM | TEMPERATURE: 99.5 F | OXYGEN SATURATION: 97 %

## 2024-06-22 DIAGNOSIS — J01.90 ACUTE NON-RECURRENT SINUSITIS, UNSPECIFIED LOCATION: Primary | ICD-10-CM

## 2024-06-22 PROCEDURE — 99213 OFFICE O/P EST LOW 20 MIN: CPT | Performed by: FAMILY MEDICINE

## 2024-06-22 NOTE — PROGRESS NOTES
"  Assessment & Plan     Acute non-recurrent sinusitis, unspecified location  No signs of pneumonia, start Augmentin.  Return precautions were discussed including difficulty breathing, chest pain or wheezing.  - amoxicillin-clavulanate (AUGMENTIN) 875-125 MG tablet  Dispense: 14 tablet; Refill: 0            BMI  Estimated body mass index is 34.32 kg/m  as calculated from the following:    Height as of 3/25/24: 1.736 m (5' 8.33\").    Weight as of this encounter: 103.4 kg (227 lb 14.4 oz).             Return in about 1 week (around 6/29/2024) for If symptoms do not improve or gets worse..    Subjective   Ivan is a 29 year old, presenting for the following health issues:  COUGH AND BODY ACHES (ONSET TUESDAY )    HPI       5-day history of bodyaches, cough, congestion, runny nose.                     Objective    /87   Pulse 87   Temp 99.5  F (37.5  C)   Wt 103.4 kg (227 lb 14.4 oz)   SpO2 97%   BMI 34.32 kg/m    Body mass index is 34.32 kg/m .  Physical Exam  Vitals reviewed.   Constitutional:       Appearance: He is ill-appearing.   HENT:      Right Ear: External ear normal.      Left Ear: External ear normal.      Ears:      Comments: Tympanic membrane's are erythematous     Nose: Congestion and rhinorrhea present.   Cardiovascular:      Rate and Rhythm: Normal rate and regular rhythm.   Pulmonary:      Effort: Pulmonary effort is normal.      Breath sounds: Normal breath sounds.                    Signed Electronically by: Mateus Liu MD        "

## 2024-06-25 ENCOUNTER — NURSE TRIAGE (OUTPATIENT)
Dept: NURSING | Facility: CLINIC | Age: 29
End: 2024-06-25
Payer: COMMERCIAL

## 2024-06-25 NOTE — TELEPHONE ENCOUNTER
"Patient reporting he was seen at [facility] on Saturday and prescribed antibiotics for a sinus infection.Seems to be helping.      Past few days has been having penis pain which is also red. \"White flakes\" when he pulls back the skin a little and a \"bad smell.\"  Denies STI.Rating pain 6/10 constant, reddened.    Disposition is to be seen within 24 hours.  Patient verbalizes understanding and agrees with plan.    Lela Centeno RN  [facility]    Reason for Disposition   Looks infected (e.g., draining sore, ulcer, rash is painful to touch)    Additional Information   Negative: Followed a genital area injury (e.g., penis, scrotum)   Negative: Pain or burning with passing urine is main symptom   Negative: Pain in scrotum or testicle is main symptom   Negative: Swollen scrotum OR lump in the scrotum/groin area   Negative: [1] Blood from end of penis AND [2] large amount   Negative: [1] Not circumcised AND [2] foreskin pulled back and stuck   Negative: [1] Looks infected (e.g., draining sore, ulcer, rash is painful to touch) AND [2] fever > 100.4 F (38.0 C)   Negative: [1] Unable to urinate (or only a few drops) > 4 hours AND [2] bladder feels very full (e.g., palpable bladder or strong urge to urinate)   Negative: [1] Prolonged unwanted erection (i.e., not related to sexual interest) AND [2] lasting > 4 hours   Negative: SEVERE pain (e.g., excruciating)   Negative: Patient sounds very sick or weak to the triager   Negative: [1] Pus or bloody discharge from the end of the penis AND [2] fever   Negative: [1] Pain or burning with passing urine AND [2] fever > 100.4 F (38.0 C)   Negative: [1] Pain or burning with passing urine AND [2] side (flank) or back pain present   Negative: Entire penis is swollen (i.e., edema)   Negative: [1] Antibiotic treatment > 3 days for STI (e.g., penile discharge from gonorrhea, chlamydia) AND [2] painful urination not improved   Negative: Pus (white, yellow) or bloody discharge from " end of penis   Negative: Pain or burning with passing urine   Negative: [1] Not circumcised AND [2] swollen foreskin   Negative: [1] Tiny water blisters rash AND [2] 3 or more    Protocols used: Penis and Scrotum Symptoms-A-[facility]

## 2024-07-02 ENCOUNTER — OFFICE VISIT (OUTPATIENT)
Dept: UROLOGY | Facility: CLINIC | Age: 29
End: 2024-07-02
Payer: COMMERCIAL

## 2024-07-02 VITALS
WEIGHT: 227.96 LBS | OXYGEN SATURATION: 100 % | TEMPERATURE: 97.1 F | BODY MASS INDEX: 34.55 KG/M2 | HEIGHT: 68 IN | HEART RATE: 71 BPM | DIASTOLIC BLOOD PRESSURE: 101 MMHG | SYSTOLIC BLOOD PRESSURE: 151 MMHG

## 2024-07-02 DIAGNOSIS — B37.42 CANDIDIASIS OF PENIS: Primary | ICD-10-CM

## 2024-07-02 LAB
ALBUMIN UR-MCNC: NEGATIVE MG/DL
APPEARANCE UR: CLEAR
BILIRUB UR QL STRIP: NEGATIVE
COLOR UR AUTO: YELLOW
GLUCOSE UR STRIP-MCNC: NEGATIVE MG/DL
HGB UR QL STRIP: NEGATIVE
KETONES UR STRIP-MCNC: NEGATIVE MG/DL
LEUKOCYTE ESTERASE UR QL STRIP: NEGATIVE
NITRATE UR QL: NEGATIVE
PH UR STRIP: 5.5 [PH] (ref 5–7)
SP GR UR STRIP: <=1.005 (ref 1–1.03)
UROBILINOGEN UR STRIP-ACNC: 0.2 E.U./DL

## 2024-07-02 PROCEDURE — 99214 OFFICE O/P EST MOD 30 MIN: CPT | Performed by: STUDENT IN AN ORGANIZED HEALTH CARE EDUCATION/TRAINING PROGRAM

## 2024-07-02 PROCEDURE — 81003 URINALYSIS AUTO W/O SCOPE: CPT | Performed by: STUDENT IN AN ORGANIZED HEALTH CARE EDUCATION/TRAINING PROGRAM

## 2024-07-02 RX ORDER — FLUCONAZOLE 150 MG/1
150 TABLET ORAL
Qty: 3 TABLET | Refills: 0 | Status: SHIPPED | OUTPATIENT
Start: 2024-07-02 | End: 2024-07-09

## 2024-07-02 ASSESSMENT — PAIN SCALES - GENERAL: PAINLEVEL: SEVERE PAIN (6)

## 2024-07-02 NOTE — PROGRESS NOTES
"    UROLOGY FOLLOW-UP NOTE          Chief Complaint:   Today I had the pleasure of seeing Mr. Ivan Mcguire in follow-up for a chief complaint of penile pain.          Interval Update   Ivan Mcguire is a very pleasant 29 year old male with a history of major depressive disorder and generalized anxiety disorder.     Brief History: . Ivan Mcguire has been seen previously for penile pain after sexual activity. These resolved without intervention.     Today's notes: He reports penile pain and redness that started about a week ago after he completed a course of Augmentin for a sinus infection. He is also having difficulty achieving an erection.          Physical Exam:   Patient is a 29 year old  male   Vitals: Blood pressure (!) 151/101, pulse 71, temperature 97.1  F (36.2  C), temperature source Tympanic, height 1.736 m (5' 8.33\"), weight 103.4 kg (227 lb 15.3 oz), SpO2 100%.  General: Alert and oriented x 3, no acute distress.  Respiratory: Non-labored breathing.  Cardiac: Regular rate.  : satellite lesions around the tip of the penis with white discharge      Labs and Pathology:    I personally reviewed all applicable laboratory data and went over findings with patient  Significant for:    CBC RESULTS:  Recent Labs   Lab Test 09/23/22  0932   WBC 5.4   HGB 15.9           BMP RESULTS:  Recent Labs   Lab Test 09/08/23  0953 09/23/22  0932   NA  --  141   POTASSIUM  --  4.2   CHLORIDE  --  100   CO2  --  26   ANIONGAP  --  15   GLC 99 102*   BUN  --  12.1   CR  --  0.97   GFRESTIMATED  --  >90   JOHNNY  --  9.3       UA RESULTS:   Recent Labs   Lab Test 01/23/24  1028 11/07/22  0858 11/03/22  2140   SG <=1.005 <=1.005 1.020   URINEPH 6.0 6.0 6.0   NITRITE Negative Negative Negative   RBCU 0-2  --  5*   WBCU 0-5  --  1            Assessment/Plan   29 year old male seen in evaluation for penile pain and redness following a course of Augmentin for sinus infection. He started clotrimazole cream last week with mild " improvement in his symptoms.     Penile exam today is consistent with candidal balanitis. Will check UA. Since topical cream is only providing minimal improvement, will try a course of oral diflucan.     Plan:  UA today.   Diflucan 150 mg x 3 for recurrent yeast.   Continue clotrimazole cream BID until rash clears.            Past Medical History:     Past Medical History:   Diagnosis Date    Allergic rhinitis due to other allergen     Depressive disorder Not sure    Christel been depressed for quite a long time, and have been on medication in the past    Moderate persistent asthma 04/18/2005            Past Surgical History:   No past surgical history on file.         Medications     Current Outpatient Medications   Medication Sig Dispense Refill    clotrimazole (LOTRIMIN) 1 % external cream Apply topically 2 times daily 40 g 3    PARoxetine (PAXIL) 20 MG tablet Take 1 tablet (20 mg) by mouth every morning 90 tablet 1     No current facility-administered medications for this visit.            Family History:     Family History   Problem Relation Age of Onset    Hypertension Mother     Hypertension Father     Allergies Father     Alzheimer Disease Maternal Grandmother     Arthritis Maternal Grandfather     Heart Disease Paternal Grandmother     Arthritis Paternal Grandmother             Social History:     Social History     Socioeconomic History    Marital status: Single     Spouse name: Not on file    Number of children: Not on file    Years of education: Not on file    Highest education level: Not on file   Occupational History    Not on file   Tobacco Use    Smoking status: Never    Smokeless tobacco: Never   Vaping Use    Vaping status: Never Used   Substance and Sexual Activity    Alcohol use: No    Drug use: No    Sexual activity: Yes     Partners: Female     Birth control/protection: Inserts/Ring   Other Topics Concern    Parent/sibling w/ CABG, MI or angioplasty before 65F 55M? No   Social History Narrative     Not on file     Social Determinants of Health     Financial Resource Strain: Low Risk  (7/19/2021)    [facility]    Overall Financial Resource Strain (CARDIA)     Difficulty of Paying Living Expenses: Not hard at all   Food Insecurity: No Food Insecurity (7/19/2021)    [facility]    Hunger Vital Sign     Worried About Running Out of Food in the Last Year: Never true     Ran Out of Food in the Last Year: Never true   Transportation Needs: No Transportation Needs (7/19/2021)    [facility]    PRAPARE - Transportation     Lack of Transportation (Medical): No     Lack of Transportation (Non-Medical): No   Physical Activity: Sufficiently Active (7/19/2021)    [facility]    Exercise Vital Sign     Days of Exercise per Week: 5 days     Minutes of Exercise per Session: 60 min   Stress: No Stress Concern Present (7/19/2021)    [facility]    [employer] [place] [employer] - Occupational Stress Questionnaire     Feeling of Stress : Only a little   Social Connections: Socially Isolated (7/19/2021)    [facility]    Social Connection and Isolation Panel [NHANES]     Frequency of Communication with Friends and Family: More than three times a week     Frequency of Social Gatherings with Friends and Family: Once a week     Attends Holiness Services: Never     Active Member of Clubs or Organizations: No     Attends Club or Organization Meetings: Never     Marital Status: Never    Interpersonal Safety: Not At Risk (7/19/2021)    Received from [facility], [facility]    Humiliation, Afraid, Rape, and Kick questionnaire     Fear of Current or Ex-Partner: No     Emotionally Abused: No     Physically Abused: No     Sexually Abused: No   Housing Stability: Not on file (7/19/2021)            Allergies:   Patient has no known allergies.         Review of Systems:  From intake questionnaire   Negative 14 system review except as noted on HPI, nurse's note.        RAYMOND MEJIA PA-C  Department of Urology

## 2024-08-06 ENCOUNTER — OFFICE VISIT (OUTPATIENT)
Dept: UROLOGY | Facility: CLINIC | Age: 29
End: 2024-08-06
Payer: COMMERCIAL

## 2024-08-06 VITALS
TEMPERATURE: 97.6 F | SYSTOLIC BLOOD PRESSURE: 140 MMHG | DIASTOLIC BLOOD PRESSURE: 92 MMHG | HEIGHT: 68 IN | HEART RATE: 80 BPM | WEIGHT: 225 LBS | OXYGEN SATURATION: 100 % | BODY MASS INDEX: 34.1 KG/M2

## 2024-08-06 DIAGNOSIS — B37.42 CANDIDIASIS OF PENIS: Primary | ICD-10-CM

## 2024-08-06 PROCEDURE — 99213 OFFICE O/P EST LOW 20 MIN: CPT | Performed by: STUDENT IN AN ORGANIZED HEALTH CARE EDUCATION/TRAINING PROGRAM

## 2024-08-06 ASSESSMENT — PAIN SCALES - GENERAL: PAINLEVEL: MODERATE PAIN (4)

## 2024-08-06 NOTE — PROGRESS NOTES
"    UROLOGY FOLLOW-UP NOTE          Chief Complaint:   Today I had the pleasure of seeing . Ivan Mcguire in follow-up for a chief complaint of penile irritation.          Interval Update   Ivan Mcguire is a very pleasant 29 year old male with a history of major depressive disorder and generalized anxiety disorder.     Brief History: . Ivan Mcguire has followed with urology for penile pain. Most recently, he was found to have candidal balanitis and was treated with oral diflucan and clotrimazole cream. He also tried Lotrisone cream.     Today's notes: He is doing well. His penile pain has essentially resolved, but he notes some ongoing redness.          Physical Exam:   Patient is a 29 year old  male   Vitals: Blood pressure (!) 140/92, pulse 80, temperature 97.6  F (36.4  C), temperature source Tympanic, height 1.736 m (5' 8.33\"), weight 102.1 kg (225 lb), SpO2 100%.  General: Alert and oriented x 3, no acute distress.  Respiratory: Non-labored breathing.  Cardiac: Regular rate.  : some erythema around the glans, improved from previous        Labs and Pathology:    I personally reviewed all applicable laboratory data and went over findings with patient  Significant for:    CBC RESULTS:  Recent Labs   Lab Test 09/23/22  0932   WBC 5.4   HGB 15.9           BMP RESULTS:  Recent Labs   Lab Test 09/08/23  0953 09/23/22  0932   NA  --  141   POTASSIUM  --  4.2   CHLORIDE  --  100   CO2  --  26   ANIONGAP  --  15   GLC 99 102*   BUN  --  12.1   CR  --  0.97   GFRESTIMATED  --  >90   JOHNNY  --  9.3       UA RESULTS:   Recent Labs   Lab Test 07/02/24  1341 01/23/24  1028 11/07/22  0858 11/03/22  2140   SG <=1.005 <=1.005 <=1.005 1.020   URINEPH 5.5 6.0 6.0 6.0   NITRITE Negative Negative Negative Negative   RBCU  --  0-2  --  5*   WBCU  --  0-5  --  1            Assessment/Plan   29 year old male seen in follow up for candidal balanitis and was treated with oral diflucan and clotrimazole cream. He also tried " Lotrisone cream.     His penile pain has essentially resolved, but he notes some ongoing redness. On exam today, some erythema is present, but overall improved from 7/02/2024 exam. We discussed keeping the area clean and dry and avoiding scented soaps. I recommended he apply OTC bacitracin ointment to help with any residual irritation, but overall I am pleased with the improvement in his symptoms.     Plan:  Follow up with urology as needed.              Past Medical History:     Past Medical History:   Diagnosis Date    Allergic rhinitis due to other allergen     Depressive disorder Not sure    Christel been depressed for quite a long time, and have been on medication in the past    Moderate persistent asthma 04/18/2005            Past Surgical History:   No past surgical history on file.         Medications     Current Outpatient Medications   Medication Sig Dispense Refill    PARoxetine (PAXIL) 20 MG tablet Take 1 tablet (20 mg) by mouth every morning 90 tablet 1     No current facility-administered medications for this visit.            Family History:     Family History   Problem Relation Age of Onset    Hypertension Mother     Hypertension Father     Allergies Father     Alzheimer Disease Maternal Grandmother     Arthritis Maternal Grandfather     Heart Disease Paternal Grandmother     Arthritis Paternal Grandmother             Social History:     Social History     Socioeconomic History    Marital status: Single     Spouse name: Not on file    Number of children: Not on file    Years of education: Not on file    Highest education level: Not on file   Occupational History    Not on file   Tobacco Use    Smoking status: Never    Smokeless tobacco: Never   Vaping Use    Vaping status: Never Used   Substance and Sexual Activity    Alcohol use: No    Drug use: No    Sexual activity: Yes     Partners: Female     Birth control/protection: Inserts/Ring   Other Topics Concern    Parent/sibling w/ CABG, MI or angioplasty  before 65F 55M? No   Social History Narrative    Not on file     Social Determinants of Health     Financial Resource Strain: Low Risk  (7/19/2021)    [facility]    Overall Financial Resource Strain (CARDIA)     Difficulty of Paying Living Expenses: Not hard at all   Food Insecurity: No Food Insecurity (7/19/2021)    [facility]    Hunger Vital Sign     Worried About Running Out of Food in the Last Year: Never true     Ran Out of Food in the Last Year: Never true   Transportation Needs: No Transportation Needs (7/19/2021)    Received from [facility]    PRAPARE - Transportation     Lack of Transportation (Medical): No     Lack of Transportation (Non-Medical): No   Physical Activity: Sufficiently Active (7/19/2021)    Received from [facility]    Exercise Vital Sign     Days of Exercise per Week: 5 days     Minutes of Exercise per Session: 60 min   Stress: No Stress Concern Present (7/19/2021)    [facility]    [employer] [place] [employer] - Occupational Stress Questionnaire     Feeling of Stress : Only a little   Social Connections: Socially Isolated (7/19/2021)    [facility] from [facility]    Social Connection and Isolation Panel [NHANES]     Frequency of Communication with Friends and Family: More than three times a week     Frequency of Social Gatherings with Friends and Family: Once a week     Attends Jainism Services: Never     Active Member of Clubs or Organizations: No     Attends Club or Organization Meetings: Never     Marital Status: Never    Interpersonal Safety: Not At Risk (7/19/2021)    Received from [facility], [facility]    Humiliation, Afraid, Rape, and Kick questionnaire     Fear of Current or Ex-Partner: No     Emotionally Abused: No     Physically Abused: No     Sexually Abused: No   Housing Stability: Not on file (7/19/2021)            Allergies:   Patient has no known allergies.         Review of Systems:  From intake questionnaire   Negative 14 system review except as noted on HPI, nurse's note.        RAYMOND MEJIA PA-C  Department of Urology

## 2024-08-06 NOTE — PATIENT INSTRUCTIONS
Try Bacitracin ointment on the tip of the penis. Keep the area clean and dry. Avoid any scented soaps.

## 2024-10-28 ENCOUNTER — MYC MEDICAL ADVICE (OUTPATIENT)
Dept: FAMILY MEDICINE | Facility: CLINIC | Age: 29
End: 2024-10-28
Payer: COMMERCIAL

## 2024-11-07 ASSESSMENT — ANXIETY QUESTIONNAIRES: GAD7 TOTAL SCORE: 4

## 2024-12-02 ENCOUNTER — MYC MEDICAL ADVICE (OUTPATIENT)
Dept: UROLOGY | Facility: CLINIC | Age: 29
End: 2024-12-02
Payer: COMMERCIAL

## 2024-12-02 DIAGNOSIS — N48.89 PENILE PAIN: ICD-10-CM

## 2024-12-02 DIAGNOSIS — L29.3 ITCHING OF PENIS: Primary | ICD-10-CM

## 2024-12-13 ENCOUNTER — MYC MEDICAL ADVICE (OUTPATIENT)
Dept: UROLOGY | Facility: CLINIC | Age: 29
End: 2024-12-13
Payer: COMMERCIAL

## 2024-12-13 DIAGNOSIS — N48.89 PENILE PAIN: Primary | ICD-10-CM

## 2024-12-16 RX ORDER — LIDOCAINE/PRILOCAINE 2.5 %-2.5%
CREAM (GRAM) TOPICAL DAILY PRN
Qty: 5 G | Refills: 3 | Status: SHIPPED | OUTPATIENT
Start: 2024-12-16

## 2025-01-01 ENCOUNTER — PATIENT OUTREACH (OUTPATIENT)
Dept: CARE COORDINATION | Facility: CLINIC | Age: 30
End: 2025-01-01
Payer: COMMERCIAL

## 2025-03-24 SDOH — HEALTH STABILITY: PHYSICAL HEALTH: ON AVERAGE, HOW MANY MINUTES DO YOU ENGAGE IN EXERCISE AT THIS LEVEL?: 30 MIN

## 2025-03-24 SDOH — HEALTH STABILITY: PHYSICAL HEALTH: ON AVERAGE, HOW MANY DAYS PER WEEK DO YOU ENGAGE IN MODERATE TO STRENUOUS EXERCISE (LIKE A BRISK WALK)?: 7 DAYS

## 2025-03-24 ASSESSMENT — SOCIAL DETERMINANTS OF HEALTH (SDOH): HOW OFTEN DO YOU GET TOGETHER WITH FRIENDS OR RELATIVES?: ONCE A WEEK

## 2025-03-25 ENCOUNTER — OFFICE VISIT (OUTPATIENT)
Dept: FAMILY MEDICINE | Facility: CLINIC | Age: 30
End: 2025-03-25
Payer: COMMERCIAL

## 2025-03-25 VITALS
TEMPERATURE: 98.5 F | RESPIRATION RATE: 18 BRPM | WEIGHT: 229.7 LBS | OXYGEN SATURATION: 100 % | HEART RATE: 80 BPM | HEIGHT: 69 IN | DIASTOLIC BLOOD PRESSURE: 96 MMHG | BODY MASS INDEX: 34.02 KG/M2 | SYSTOLIC BLOOD PRESSURE: 128 MMHG

## 2025-03-25 DIAGNOSIS — Z13.220 LIPID SCREENING: ICD-10-CM

## 2025-03-25 DIAGNOSIS — F52.4 PREMATURE EJACULATION: ICD-10-CM

## 2025-03-25 DIAGNOSIS — F41.1 GAD (GENERALIZED ANXIETY DISORDER): ICD-10-CM

## 2025-03-25 DIAGNOSIS — Z00.00 ROUTINE GENERAL MEDICAL EXAMINATION AT A HEALTH CARE FACILITY: Primary | ICD-10-CM

## 2025-03-25 DIAGNOSIS — Z13.1 SCREENING FOR DIABETES MELLITUS: ICD-10-CM

## 2025-03-25 DIAGNOSIS — I10 HYPERTENSION, UNSPECIFIED TYPE: ICD-10-CM

## 2025-03-25 DIAGNOSIS — Z13.0 SCREENING, ANEMIA, DEFICIENCY, IRON: ICD-10-CM

## 2025-03-25 DIAGNOSIS — F33.42 RECURRENT MAJOR DEPRESSIVE DISORDER, IN FULL REMISSION: ICD-10-CM

## 2025-03-25 DIAGNOSIS — Z76.89 ENCOUNTER TO ESTABLISH CARE: ICD-10-CM

## 2025-03-25 DIAGNOSIS — F41.9 ANXIETY: ICD-10-CM

## 2025-03-25 PROBLEM — F33.1 MODERATE EPISODE OF RECURRENT MAJOR DEPRESSIVE DISORDER (H): Status: RESOLVED | Noted: 2023-03-13 | Resolved: 2025-03-25

## 2025-03-25 PROBLEM — F32.1 CURRENT MODERATE EPISODE OF MAJOR DEPRESSIVE DISORDER (H): Status: RESOLVED | Noted: 2021-07-06 | Resolved: 2025-03-25

## 2025-03-25 LAB
ALBUMIN SERPL BCG-MCNC: 4.5 G/DL (ref 3.5–5.2)
ALP SERPL-CCNC: 62 U/L (ref 40–150)
ALT SERPL W P-5'-P-CCNC: 58 U/L (ref 0–70)
ANION GAP SERPL CALCULATED.3IONS-SCNC: 12 MMOL/L (ref 7–15)
AST SERPL W P-5'-P-CCNC: 39 U/L (ref 0–45)
BILIRUB SERPL-MCNC: 0.5 MG/DL
BUN SERPL-MCNC: 13.5 MG/DL (ref 6–20)
CALCIUM SERPL-MCNC: 9.1 MG/DL (ref 8.8–10.4)
CHLORIDE SERPL-SCNC: 104 MMOL/L (ref 98–107)
CHOLEST SERPL-MCNC: 246 MG/DL
CREAT SERPL-MCNC: 0.91 MG/DL (ref 0.67–1.17)
EGFRCR SERPLBLD CKD-EPI 2021: >90 ML/MIN/1.73M2
ERYTHROCYTE [DISTWIDTH] IN BLOOD BY AUTOMATED COUNT: 11.6 % (ref 10–15)
FASTING STATUS PATIENT QL REPORTED: YES
FASTING STATUS PATIENT QL REPORTED: YES
GLUCOSE SERPL-MCNC: 102 MG/DL (ref 70–99)
HCO3 SERPL-SCNC: 25 MMOL/L (ref 22–29)
HCT VFR BLD AUTO: 43.5 % (ref 40–53)
HDLC SERPL-MCNC: 41 MG/DL
HGB BLD-MCNC: 15.2 G/DL (ref 13.3–17.7)
LDLC SERPL CALC-MCNC: 156 MG/DL
MCH RBC QN AUTO: 31.1 PG (ref 26.5–33)
MCHC RBC AUTO-ENTMCNC: 34.9 G/DL (ref 31.5–36.5)
MCV RBC AUTO: 89 FL (ref 78–100)
NONHDLC SERPL-MCNC: 205 MG/DL
PLATELET # BLD AUTO: 264 10E3/UL (ref 150–450)
POTASSIUM SERPL-SCNC: 4.5 MMOL/L (ref 3.4–5.3)
PROT SERPL-MCNC: 7.7 G/DL (ref 6.4–8.3)
RBC # BLD AUTO: 4.88 10E6/UL (ref 4.4–5.9)
SODIUM SERPL-SCNC: 141 MMOL/L (ref 135–145)
TRIGL SERPL-MCNC: 244 MG/DL
WBC # BLD AUTO: 5.3 10E3/UL (ref 4–11)

## 2025-03-25 PROCEDURE — G2211 COMPLEX E/M VISIT ADD ON: HCPCS

## 2025-03-25 PROCEDURE — 80053 COMPREHEN METABOLIC PANEL: CPT

## 2025-03-25 PROCEDURE — 1126F AMNT PAIN NOTED NONE PRSNT: CPT

## 2025-03-25 PROCEDURE — 80061 LIPID PANEL: CPT

## 2025-03-25 PROCEDURE — 36415 COLL VENOUS BLD VENIPUNCTURE: CPT

## 2025-03-25 PROCEDURE — 3080F DIAST BP >= 90 MM HG: CPT

## 2025-03-25 PROCEDURE — 99214 OFFICE O/P EST MOD 30 MIN: CPT | Mod: 25

## 2025-03-25 PROCEDURE — 99395 PREV VISIT EST AGE 18-39: CPT

## 2025-03-25 PROCEDURE — 85027 COMPLETE CBC AUTOMATED: CPT

## 2025-03-25 PROCEDURE — 3074F SYST BP LT 130 MM HG: CPT

## 2025-03-25 RX ORDER — PAROXETINE 20 MG/1
20 TABLET, FILM COATED ORAL EVERY MORNING
Qty: 90 TABLET | Refills: 1 | Status: CANCELLED | OUTPATIENT
Start: 2025-03-25

## 2025-03-25 RX ORDER — PAROXETINE 30 MG/1
30 TABLET, FILM COATED ORAL EVERY MORNING
Qty: 90 TABLET | Refills: 0 | Status: SHIPPED | OUTPATIENT
Start: 2025-03-25

## 2025-03-25 ASSESSMENT — ANXIETY QUESTIONNAIRES
5. BEING SO RESTLESS THAT IT IS HARD TO SIT STILL: SEVERAL DAYS
GAD7 TOTAL SCORE: 6
1. FEELING NERVOUS, ANXIOUS, OR ON EDGE: SEVERAL DAYS
6. BECOMING EASILY ANNOYED OR IRRITABLE: NOT AT ALL
GAD7 TOTAL SCORE: 6
7. FEELING AFRAID AS IF SOMETHING AWFUL MIGHT HAPPEN: NOT AT ALL
IF YOU CHECKED OFF ANY PROBLEMS ON THIS QUESTIONNAIRE, HOW DIFFICULT HAVE THESE PROBLEMS MADE IT FOR YOU TO DO YOUR WORK, TAKE CARE OF THINGS AT HOME, OR GET ALONG WITH OTHER PEOPLE: SOMEWHAT DIFFICULT
3. WORRYING TOO MUCH ABOUT DIFFERENT THINGS: SEVERAL DAYS
2. NOT BEING ABLE TO STOP OR CONTROL WORRYING: SEVERAL DAYS

## 2025-03-25 ASSESSMENT — PATIENT HEALTH QUESTIONNAIRE - PHQ9: 5. POOR APPETITE OR OVEREATING: MORE THAN HALF THE DAYS

## 2025-03-25 ASSESSMENT — PAIN SCALES - GENERAL: PAINLEVEL_OUTOF10: NO PAIN (0)

## 2025-03-25 NOTE — PATIENT INSTRUCTIONS
Check blood pressure (BP) readings at home. Once daily is fine, alternate taking in the AM and PM. Goal blood pressure is 120/80. Update me with readings via [employer] in 1-3 weeks. Depending upon how these are looking I may start a medication.    Work on diet and exercise in the mean time.     Follow up in 2-3 months for med check for the paxil (virtual appointment is ok if you'd like).         Patient Education   Preventive Care Advice   This is general advice given by our system to help you stay healthy. However, your care team may have specific advice just for you. Please talk to your care team about your preventive care needs.  Nutrition  Eat 5 or more servings of fruits and vegetables each day.  Try wheat bread, brown rice and whole grain pasta (instead of white bread, rice, and pasta).  Get enough calcium and vitamin D. Check the label on foods and aim for 100% of the RDA (recommended daily allowance).  Lifestyle  Exercise at least 150 minutes each week  (30 minutes a day, 5 days a week).  Do muscle strengthening activities 2 days a week. These help control your weight and prevent disease.  No smoking.  Wear sunscreen to prevent skin cancer.  Have a dental exam and cleaning every 6 months.  Yearly exams  See your health care team every year to talk about:  Any changes in your health.  Any medicines your care team has prescribed.  Preventive care, family planning, and ways to prevent chronic diseases.  Shots (vaccines)   HPV shots (up to age 26), if you've never had them before.  Hepatitis B shots (up to age 59), if you've never had them before.  COVID-19 shot: Get this shot when it's due.  Flu shot: Get a flu shot every year.  Tetanus shot: Get a tetanus shot every 10 years.  Pneumococcal, hepatitis A, and RSV shots: Ask your care team if you need these based on your risk.  Shingles shot (for age 50 and up)  General health tests  Diabetes screening:  Starting at age 35, Get screened for diabetes at least every  3 years.  If you are younger than age 35, ask your care team if you should be screened for diabetes.  Cholesterol test: At age 39, start having a cholesterol test every 5 years, or more often if advised.  Bone density scan (DEXA): At age 50, ask your care team if you should have this scan for osteoporosis (brittle bones).  Hepatitis C: Get tested at least once in your life.  STIs (sexually transmitted infections)  Before age 24: Ask your care team if you should be screened for STIs.  After age 24: Get screened for STIs if you're at risk. You are at risk for STIs (including HIV) if:  You are sexually active with more than one person.  You don't use condoms every time.  You or a partner was diagnosed with a sexually transmitted infection.  If you are at risk for HIV, ask about PrEP medicine to prevent HIV.  Get tested for HIV at least once in your life, whether you are at risk for HIV or not.  Cancer screening tests  Cervical cancer screening: If you have a cervix, begin getting regular cervical cancer screening tests starting at age 21.  Breast cancer scan (mammogram): If you've ever had breasts, begin having regular mammograms starting at age 40. This is a scan to check for breast cancer.  Colon cancer screening: It is important to start screening for colon cancer at age 45.  Have a colonoscopy test every 10 years (or more often if you're at risk) Or, ask your provider about stool tests like a FIT test every year or Cologuard test every 3 years.  To learn more about your testing options, visit:   .  For help making a decision, visit:   https://bit.ly/my49390.  Prostate cancer screening test: If you have a prostate, ask your care team if a prostate cancer screening test (PSA) at age 55 is right for you.  Lung cancer screening: If you are a current or former smoker ages 50 to 80, ask your care team if ongoing lung cancer screenings are right for you.  For informational purposes only. Not to replace the advice of your  health care provider. Copyright   2023 [facility]. All rights reserved. Clinically reviewed by the [facility] Transitions Program. [employer] 755202 - REV 01/24.  Learning About Stress  What is stress?     Stress is your body's response to a hard situation. Your body can have a physical, emotional, or mental response. Stress is a fact of life for most people, and it affects everyone differently. What causes stress for you may not be stressful for someone else.  A lot of things can cause stress. You may feel stress when you go on a job interview, take a test, or run a race. This kind of short-term stress is normal and even useful. It can help you if you need to work hard or react quickly. For example, stress can help you finish an important job on time.  Long-term stress is caused by ongoing stressful situations or events. Examples of long-term stress include long-term health problems, ongoing problems at work, or conflicts in your family. Long-term stress can harm your health.  How does stress affect your health?  When you are stressed, your body responds as though you are in danger. It makes hormones that speed up your heart, make you breathe faster, and give you a burst of energy. This is called the fight-or-flight stress response. If the stress is over quickly, your body goes back to normal and no harm is done.  But if stress happens too often or lasts too long, it can have bad effects. Long-term stress can make you more likely to get sick, and it can make symptoms of some diseases worse. If you tense up when you are stressed, you may develop neck, shoulder, or low back pain. Stress is linked to high blood pressure and heart disease.  Stress also harms your emotional health. It can make you carvajal, tense, or depressed. Your relationships may suffer, and you may not do well at work or school.  What can you do to manage stress?  You can try these things to help manage stress:   Do something  active. Exercise or activity can help reduce stress. Walking is a great way to get started. Even everyday activities such as housecleaning or yard work can help.  Try yoga or ashley chi. These techniques combine exercise and meditation. You may need some training at first to learn them.  Do something you enjoy. For example, listen to music or go to a movie. Practice your hobby or do volunteer work.  Meditate. This can help you relax, because you are not worrying about what happened before or what may happen in the future.  Do guided imagery. Imagine yourself in any setting that helps you feel calm. You can use online videos, books, or a teacher to guide you.  Do breathing exercises. For example:  From a standing position, bend forward from the waist with your knees slightly bent. Let your arms dangle close to the floor.  Breathe in slowly and deeply as you return to a standing position. Roll up slowly and lift your head last.  Hold your breath for just a few seconds in the standing position.  Breathe out slowly and bend forward from the waist.  Let your feelings out. Talk, laugh, cry, and express anger when you need to. Talking with supportive friends or family, a counselor, or a drew leader about your feelings is a healthy way to relieve stress. Avoid discussing your feelings with people who make you feel worse.  Write. It may help to write about things that are bothering you. This helps you find out how much stress you feel and what is causing it. When you know this, you can find better ways to cope.  What can you do to prevent stress?  You might try some of these things to help prevent stress:  Manage your time. This helps you find time to do the things you want and need to do.  Get enough sleep. Your body recovers from the stresses of the day while you are sleeping.  Get support. Your family, friends, and community can make a difference in how you experience stress.  Limit your news feed. Avoid or limit time on  "social media or news that may make you feel stressed.  Do something active. Exercise or activity can help reduce stress. Walking is a great way to get started.  Where can you learn more?  Go to https://www.[employer].net/patiented  Enter N032 in the search box to learn more about \"Learning About Stress.\"  Current as of: October 24, 2024  Content Version: 14.4    5139-9809 [employer].   Care instructions adapted under license by your healthcare professional. If you have questions about a medical condition or this instruction, always ask your healthcare professional. [employer] disclaims any warranty or liability for your use of this information.    Substance Use Disorder: Care Instructions  Overview     You can improve your life and health by stopping your use of alcohol or drugs. When you don't drink or use drugs, you may feel and sleep better. You may get along better with your family, friends, and coworkers. There are medicines and programs that can help with substance use disorder.  How can you care for yourself at home?  Here are some ways to help you stay sober and prevent relapse.  If you have been given medicine to help keep you sober or reduce your cravings, be sure to take it exactly as prescribed.  Talk to your doctor about programs that can help you stop using drugs or drinking alcohol.  Do not keep alcohol or drugs in your home.  Plan ahead. Think about what you'll say if other people ask you to drink or use drugs. Try not to spend time with people who drink or use drugs.  Use the time and money spent on drinking or drugs to do something that's important to you.  Preventing a relapse  Have a plan to deal with relapse. Learn to recognize changes in your thinking that lead you to drink or use drugs. Get help before you start to drink or use drugs again.  Try to stay away from situations, friends, or places that may lead you to drink or use drugs.  If you feel the need to drink " alcohol or use drugs again, seek help right away. Call a trusted friend or family member. Some people get support from organizations such as Narcotics Anonymous or [employer] or from treatment facilities.  If you relapse, get help as soon as you can. Some people make a plan with another person that outlines what they want that person to do for them if they relapse. The plan usually includes how to handle the relapse and who to notify in case of relapse.  Don't give up. Remember that a relapse doesn't mean that you have failed. Use the experience to learn the triggers that lead you to drink or use drugs. Then quit again. Recovery is a lifelong process. Many people have several relapses before they are able to quit for good.  Follow-up care is a key part of your treatment and safety. Be sure to make and go to all appointments, and call your doctor if you are having problems. It's also a good idea to know your test results and keep a list of the medicines you take.  When should you call for help?   Call 911  anytime you think you may need emergency care. For example, call if you or someone else:    Has overdosed or has withdrawal signs. Be sure to tell the emergency workers that you are or someone else is using or trying to quit using drugs. Overdose or withdrawal signs may include:  Losing consciousness.  Seizure.  Seeing or hearing things that aren't there (hallucinations).     Is thinking or talking about suicide or harming others.   Where to get help 24 hours a day, 7 days a week   If you or someone you know talks about suicide, self-harm, a mental health crisis, a substance use crisis, or any other kind of emotional distress, get help right away. You can:    Call the Suicide and Crisis Lifeline at 328.     Call 5-896-378-TALK (1-869.972.2477).     Text HOME to 270715 to access the Crisis Text Line.   Consider saving these numbers in your phone.  Go to [employer] for more information or to chat  "online.  Call your doctor now or seek immediate medical care if:    You are having withdrawal symptoms. These may include nausea or vomiting, sweating, shakiness, and anxiety.   Watch closely for changes in your health, and be sure to contact your doctor if:    You have a relapse.     You need more help or support to stop.   Where can you learn more?  Go to https://www.[employer].net/patiented  Enter H573 in the search box to learn more about \"Substance Use Disorder: Care Instructions.\"  Current as of: August 20, 2024  Content Version: 14.4    3719-0350 [employer].   Care instructions adapted under license by your healthcare professional. If you have questions about a medical condition or this instruction, always ask your healthcare professional. [employer] disclaims any warranty or liability for your use of this information.       "

## 2025-03-25 NOTE — PROGRESS NOTES
Preventive Care Visit  [facility]  Sulema Gonsalez PA-C, Family Medicine  Mar 25, 2025      Assessment & Plan     Routine general medical examination at a health care facility  Patient seen today for annual physical exam.  Routine health maintenance reviewed.   Acute and chronic concerns addressed below.   - REVIEW OF HEALTH MAINTENANCE PROTOCOL ORDERS  - PRIMARY CARE FOLLOW-UP SCHEDULING    Encounter to establish care  Patient presents today to establish care with myself.  Past medical, social, surgical, family history were reviewed and updated.    Hypertension, unspecified type  New diagnosis today based upon today's BP reading and historic clinical BP readings.  Patient with significant anxiety and I need to rule out that his BP levels are not running within normal limits at home. I will have patient take his home BP daily and then update me in 1-3 weeks via [employer] with his readings. Pending how these look I will consider starting a medication.  Discussed DASH diet and dietary sodium restrictions.  Labs checked today: CMP  Follow up in 1-3 weeks with BP readings via [employer].    Recurrent major depressive disorder, in full remission  LIZZETH (generalized anxiety disorder)  PHQ-9 score today 3 improved from 5  LIZZETH-7 score today 6 improved from 12  No SI/HI  Medications: Increase paxil from 20 mg once daily to 30 mg once daily   Therapy: Not currently following with a therapist  Recommended mindfulness, meditation, and exercise. Sleep hygiene.   Follow up: 2-3 months for med check, virtual ok  - PARoxetine (PAXIL) 30 MG tablet  Dispense: 90 tablet; Refill: 0    Premature ejaculation  - PARoxetine (PAXIL) 30 MG tablet  Dispense: 90 tablet; Refill: 0    Lipid screening  - Lipid panel reflex to direct LDL Fasting    Screening, anemia, deficiency, iron  - CBC with platelets    Screening for diabetes mellitus  - Comprehensive metabolic panel (BMP + Alb, Alk Phos, ALT, AST, Total. Bili, TP)      The  "longitudinal plan of care for the diagnosis(es)/condition(s) as documented were addressed during this visit. Due to the added complexity in care, I will continue to support Ivan in the subsequent management and with ongoing continuity of care.      Patient has been advised of split billing requirements and indicates understanding: Yes    BMI  Estimated body mass index is 34.12 kg/m  as calculated from the following:    Height as of this encounter: 1.748 m (5' 8.8\").    Weight as of this encounter: 104.2 kg (229 lb 11.2 oz).   Weight management plan: Discussed healthy diet and exercise guidelines    Counseling  Appropriate preventive services were addressed with this patient via screening, questionnaire, or discussion as appropriate for fall prevention, nutrition, physical activity, Tobacco-use cessation, social engagement, weight loss and cognition.  Checklist reviewing preventive services available has been given to the patient.  Reviewed patient's diet, addressing concerns and/or questions.   He is at risk for psychosocial distress and has been provided with information to reduce risk.       Subjective   Ivan is a 30 year old, presenting for the following:  Physical (Pt is fasting. Establish care. Med check. )        3/25/2025     8:06 AM   Additional Questions   Roomed by Melisa RIVERS     Anxiety/depression: Feels anxiety isn't going particularly well. Open to increasing to 30 mg once daily. Denies any side effects. No SI/HI. Has had therapy on and off.     HTN: No prior diagnosis. Doesn't monitor BP at home. Denies any HA, CP, or blurry vision.           3/24/2025   General Health   How would you rate your overall physical health? (!) FAIR   Feel stress (tense, anxious, or unable to sleep) To some extent   (!) STRESS CONCERN      3/24/2025   Nutrition   Three or more servings of calcium each day? Yes   Diet: Regular (no restrictions)   How many servings of fruit and vegetables per day? (!) 2-3 "   How many sweetened beverages each day? 0-1         3/24/2025   Exercise   Days per week of moderate/strenous exercise 7 days   Average minutes spent exercising at this level 30 min         3/24/2025   Social Factors   Frequency of gathering with friends or relatives Once a week   Worry food won't last until get money to buy more No   Food not last or not have enough money for food? No   Do you have housing? (Housing is defined as stable permanent housing and does not include staying ouside in a car, in a tent, in an abandoned building, in an overnight shelter, or couch-surfing.) Yes   Are you worried about losing your housing? No   Lack of transportation? No   Unable to get utilities (heat,electricity)? No         3/24/2025   Dental   Dentist two times every year? Yes           10/20/2024   TB Screening   Were you born outside of the [place]? No           Today's PHQ-9 Score:       3/24/2025     9:24 AM   PHQ-9 SCORE   PHQ-9 Total Score MyChart 3 (Minimal depression)   PHQ-9 Total Score 3        Patient-reported           3/24/2025   Substance Use   Alcohol more than 3/day or more than 7/wk Not Applicable   Do you use any other substances recreationally? (!) CANNABIS PRODUCTS     Social History     Tobacco Use    Smoking status: Never     Passive exposure: Never    Smokeless tobacco: Never   Vaping Use    Vaping status: Never Used   Substance Use Topics    Alcohol use: No    Drug use: No           3/24/2025   STI Screening   New sexual partner(s) since last STI/HIV test? No         3/24/2025   Contraception/Family Planning   Questions about contraception or family planning No        Reviewed and updated as needed this visit by Provider                    BP Readings from Last 3 Encounters:   03/25/25 (!) 128/96   08/06/24 (!) 140/92   07/02/24 (!) 151/101    Wt Readings from Last 3 Encounters:   03/25/25 104.2 kg (229 lb 11.2 oz)   08/06/24 102.1 kg (225 lb)   07/02/24 103.4 kg (227 lb 15.3 oz)             Review of  "Systems  Constitutional, neuro, ENT, endocrine, pulmonary, cardiac, gastrointestinal, genitourinary, musculoskeletal, integument and psychiatric systems are negative, except as otherwise noted.     Objective    Exam  BP (!) 128/96 (BP Location: Right arm, Patient Position: Sitting, Cuff Size: Adult Regular)   Pulse 80   Temp 98.5  F (36.9  C) (Oral)   Resp 18   Ht 1.748 m (5' 8.8\")   Wt 104.2 kg (229 lb 11.2 oz)   SpO2 100%   BMI 34.12 kg/m     Estimated body mass index is 34.12 kg/m  as calculated from the following:    Height as of this encounter: 1.748 m (5' 8.8\").    Weight as of this encounter: 104.2 kg (229 lb 11.2 oz).          6/23/2023     8:23 AM 6/18/2024     7:03 PM 3/25/2025     9:29 AM   LIZZETH-7 SCORE   Total Score 2 (minimal anxiety) 12 (moderate anxiety)    Total Score 2 12 6         9/1/2023     9:14 AM 6/18/2024     7:01 PM 3/24/2025     9:24 AM   PHQ   PHQ-9 Total Score 0 5 3    Q9: Thoughts of better off dead/self-harm past 2 weeks Not at all Not at all Not at all       Patient-reported         Physical Exam  GENERAL: alert and no distress  EYES: Eyes grossly normal to inspection, conjunctivae and sclerae normal  HENT: ear canals and TM's normal, nose and mouth without ulcers or lesions  NECK: no adenopathy, no asymmetry, masses, or scars  RESP: lungs clear to auscultation - no rales, rhonchi or wheezes  CV: regular rate and rhythm, normal S1 S2, no S3 or S4, no murmur, click or rub, no peripheral edema  MS: no gross musculoskeletal defects noted, no edema  SKIN: no suspicious lesions or rashes  NEURO: Normal strength and tone, mentation intact and speech normal  PSYCH: mentation appears normal, affect normal/bright        Signed Electronically by: Sulema Gonsalez PA-C    "

## 2025-04-12 ENCOUNTER — MYC MEDICAL ADVICE (OUTPATIENT)
Dept: FAMILY MEDICINE | Facility: CLINIC | Age: 30
End: 2025-04-12
Payer: COMMERCIAL

## 2025-04-12 DIAGNOSIS — I10 HYPERTENSION, UNSPECIFIED TYPE: Primary | ICD-10-CM

## 2025-04-14 RX ORDER — LISINOPRIL 5 MG/1
5 TABLET ORAL DAILY
Qty: 30 TABLET | Refills: 0 | Status: SHIPPED | OUTPATIENT
Start: 2025-04-14 | End: 2025-04-15

## 2025-04-14 NOTE — TELEPHONE ENCOUNTER
See Pt Mychart report of at home BP readings  Most recent home BP reading on 4/10 entered into pt reported vital section of chart.    3.25 OV notes  Hypertension, unspecified type  New diagnosis today based upon today's BP reading and historic clinical BP readings.  Patient with significant anxiety and I need to rule out that his BP levels are not running within normal limits at home. I will have patient take his home BP daily and then update me in 1-3 weeks via [employer] with his readings. Pending how these look I will consider starting a medication.  Discussed DASH diet and dietary sodium restrictions.  Labs checked today: CMP  Follow up in 1-3 weeks with BP readings via [employer].

## 2025-04-14 NOTE — TELEPHONE ENCOUNTER
[employer] message sent relaying PCP recommendations.     Barbara Aguirre RN  [facility]

## 2025-04-14 NOTE — TELEPHONE ENCOUNTER
Please let patient know I sent a prescription for lisinopril to the pharmacy for him.     Take once daily in the AM. Usually well tolerated but can cause a cough. Let me know if this is a concern.     Continue to routinely monitor home blood pressures. Let me know if 1-2 weeks how they are running. Goal BP is 120/80.     Hold medication if BP <100/60 or feeling dizzy/lightheaded.

## 2025-04-15 RX ORDER — LOSARTAN POTASSIUM 25 MG/1
25 TABLET ORAL DAILY
Qty: 30 TABLET | Refills: 0 | Status: SHIPPED | OUTPATIENT
Start: 2025-04-15

## 2025-05-17 ENCOUNTER — MYC MEDICAL ADVICE (OUTPATIENT)
Dept: FAMILY MEDICINE | Facility: CLINIC | Age: 30
End: 2025-05-17
Payer: COMMERCIAL

## 2025-05-17 DIAGNOSIS — I10 HYPERTENSION, UNSPECIFIED TYPE: Primary | ICD-10-CM

## 2025-05-19 DIAGNOSIS — I10 HYPERTENSION, UNSPECIFIED TYPE: ICD-10-CM

## 2025-05-19 RX ORDER — LOSARTAN POTASSIUM 50 MG/1
100 TABLET ORAL DAILY
Qty: 60 TABLET | Refills: 0 | Status: SHIPPED | OUTPATIENT
Start: 2025-05-19 | End: 2025-05-20

## 2025-05-20 DIAGNOSIS — I10 HYPERTENSION, UNSPECIFIED TYPE: ICD-10-CM

## 2025-05-20 RX ORDER — LOSARTAN POTASSIUM 50 MG/1
100 TABLET ORAL DAILY
Qty: 60 TABLET | Refills: 0 | Status: SHIPPED | OUTPATIENT
Start: 2025-05-20

## 2025-05-20 RX ORDER — LOSARTAN POTASSIUM 50 MG/1
100 TABLET ORAL DAILY
Qty: 180 TABLET | OUTPATIENT
Start: 2025-05-20

## 2025-05-20 NOTE — TELEPHONE ENCOUNTER
This refill request is a duplicate request, previously received or sent.  Sent denial notification to pharmacy.    GERALDINE CrossN, RN  [facility]

## 2025-05-20 NOTE — TELEPHONE ENCOUNTER
This refill request is a duplicate request, previously received or sent.  Sent denial notification to pharmacy.    GERALDINE CrossN, RN  [facility]

## 2025-06-04 ENCOUNTER — MYC MEDICAL ADVICE (OUTPATIENT)
Dept: FAMILY MEDICINE | Facility: CLINIC | Age: 30
End: 2025-06-04
Payer: COMMERCIAL

## 2025-06-04 DIAGNOSIS — I10 HYPERTENSION, UNSPECIFIED TYPE: Primary | ICD-10-CM

## 2025-06-05 DIAGNOSIS — I10 HYPERTENSION, UNSPECIFIED TYPE: ICD-10-CM

## 2025-06-05 RX ORDER — LOSARTAN POTASSIUM AND HYDROCHLOROTHIAZIDE 12.5; 1 MG/1; MG/1
1 TABLET ORAL DAILY
Qty: 90 TABLET | OUTPATIENT
Start: 2025-06-05

## 2025-06-05 RX ORDER — LOSARTAN POTASSIUM AND HYDROCHLOROTHIAZIDE 12.5; 1 MG/1; MG/1
1 TABLET ORAL DAILY
Qty: 30 TABLET | Refills: 0 | Status: SHIPPED | OUTPATIENT
Start: 2025-06-05

## 2025-06-05 NOTE — TELEPHONE ENCOUNTER
Pharmacy kicked back and requested 90 day supply-see telephone encounter from 6/5/25. Call placed to pharmacy to explain 30 day supply to ensure it works for the pt first.     Pt notified via [employer] message.

## 2025-07-17 ENCOUNTER — TRANSFERRED RECORDS (OUTPATIENT)
Dept: HEALTH INFORMATION MANAGEMENT | Facility: CLINIC | Age: 30
End: 2025-07-17
Payer: COMMERCIAL